# Patient Record
Sex: FEMALE | Race: WHITE | Employment: PART TIME | URBAN - METROPOLITAN AREA
[De-identification: names, ages, dates, MRNs, and addresses within clinical notes are randomized per-mention and may not be internally consistent; named-entity substitution may affect disease eponyms.]

---

## 2020-11-03 ENCOUNTER — HOSPITAL ENCOUNTER (OUTPATIENT)
Dept: WOMENS IMAGING | Age: 60
Discharge: HOME | End: 2020-11-03
Payer: COMMERCIAL

## 2020-11-03 PROCEDURE — 77067 SCR MAMMO BI INCL CAD: CPT

## 2020-11-11 ENCOUNTER — HOSPITAL ENCOUNTER (OUTPATIENT)
Dept: WOMENS IMAGING | Age: 60
Discharge: HOME | End: 2020-11-11
Payer: COMMERCIAL

## 2020-11-11 ENCOUNTER — HOSPITAL ENCOUNTER (OUTPATIENT)
Dept: ULTRASOUND IMAGING | Age: 60
Discharge: HOME | End: 2020-11-11
Payer: COMMERCIAL

## 2020-11-11 PROCEDURE — 76642 ULTRASOUND BREAST LIMITED: CPT

## 2020-11-11 PROCEDURE — G0279 TOMOSYNTHESIS, MAMMO: HCPCS

## 2021-12-16 ENCOUNTER — HOSPITAL ENCOUNTER (OUTPATIENT)
Dept: ULTRASOUND IMAGING | Age: 61
End: 2021-12-16
Payer: COMMERCIAL

## 2021-12-16 ENCOUNTER — HOSPITAL ENCOUNTER (OUTPATIENT)
Dept: WOMENS IMAGING | Age: 61
Discharge: HOME | End: 2021-12-16
Payer: COMMERCIAL

## 2021-12-16 DIAGNOSIS — R92.8 ABNORMAL MAMMOGRAM: ICD-10-CM

## 2021-12-16 PROCEDURE — G0279 TOMOSYNTHESIS, MAMMO: HCPCS

## 2023-01-20 ENCOUNTER — HOSPITAL ENCOUNTER (OUTPATIENT)
Age: 63
Discharge: HOME | End: 2023-01-20
Payer: COMMERCIAL

## 2023-01-20 DIAGNOSIS — J43.2 CENTRILOBULAR EMPHYSEMA (HCC): ICD-10-CM

## 2023-01-20 LAB
BASOPHILS ABSOLUTE: 0.1 K/CU MM
BASOPHILS RELATIVE PERCENT: 1 % (ref 0–1)
DIFFERENTIAL TYPE: ABNORMAL
EOSINOPHILS ABSOLUTE: 0.5 K/CU MM
EOSINOPHILS RELATIVE PERCENT: 5.8 % (ref 0–3)
HCT VFR BLD CALC: 42.7 % (ref 37–47)
HEMOGLOBIN: 13.4 GM/DL (ref 12.5–16)
IMMATURE NEUTROPHIL %: 0.4 % (ref 0–0.43)
LYMPHOCYTES ABSOLUTE: 2.5 K/CU MM
LYMPHOCYTES RELATIVE PERCENT: 27.3 % (ref 24–44)
MCH RBC QN AUTO: 28.2 PG (ref 27–31)
MCHC RBC AUTO-ENTMCNC: 31.4 % (ref 32–36)
MCV RBC AUTO: 89.9 FL (ref 78–100)
MONOCYTES ABSOLUTE: 0.6 K/CU MM
MONOCYTES RELATIVE PERCENT: 6.7 % (ref 0–4)
NUCLEATED RBC %: 0 %
PDW BLD-RTO: 13 % (ref 11.7–14.9)
PLATELET # BLD: 229 K/CU MM (ref 140–440)
PMV BLD AUTO: 10.4 FL (ref 7.5–11.1)
RBC # BLD: 4.75 M/CU MM (ref 4.2–5.4)
SEGMENTED NEUTROPHILS ABSOLUTE COUNT: 5.3 K/CU MM
SEGMENTED NEUTROPHILS RELATIVE PERCENT: 58.8 % (ref 36–66)
TOTAL IMMATURE NEUTOROPHIL: 0.04 K/CU MM
TOTAL NUCLEATED RBC: 0 K/CU MM
WBC # BLD: 9 K/CU MM (ref 4–10.5)

## 2023-01-20 PROCEDURE — 85025 COMPLETE CBC W/AUTO DIFF WBC: CPT

## 2023-01-20 PROCEDURE — 82785 ASSAY OF IGE: CPT

## 2023-01-20 PROCEDURE — 36415 COLL VENOUS BLD VENIPUNCTURE: CPT

## 2023-01-21 LAB — IMMUNOGLOBULIN E: 104 KU/L

## 2023-02-13 ENCOUNTER — HOSPITAL ENCOUNTER (OUTPATIENT)
Age: 63
Discharge: HOME | End: 2023-02-13
Payer: COMMERCIAL

## 2023-02-13 ENCOUNTER — HOSPITAL ENCOUNTER (OUTPATIENT)
Dept: GENERAL RADIOLOGY | Age: 63
Discharge: HOME | End: 2023-02-13
Payer: COMMERCIAL

## 2023-02-13 DIAGNOSIS — R05.1 ACUTE COUGH: ICD-10-CM

## 2023-02-13 PROCEDURE — 71046 X-RAY EXAM CHEST 2 VIEWS: CPT

## 2023-04-06 ENCOUNTER — APPOINTMENT (OUTPATIENT)
Dept: CT IMAGING | Age: 63
End: 2023-04-06
Payer: COMMERCIAL

## 2023-04-06 ENCOUNTER — HOSPITAL ENCOUNTER (INPATIENT)
Age: 63
LOS: 1 days | Discharge: HOME | End: 2023-04-07
Attending: EMERGENCY MEDICINE | Admitting: HOSPITALIST
Payer: COMMERCIAL

## 2023-04-06 ENCOUNTER — APPOINTMENT (OUTPATIENT)
Dept: GENERAL RADIOLOGY | Age: 63
End: 2023-04-06
Payer: COMMERCIAL

## 2023-04-06 DIAGNOSIS — I16.0 HYPERTENSIVE URGENCY: Primary | ICD-10-CM

## 2023-04-06 LAB
ALBUMIN SERPL-MCNC: 4.5 GM/DL (ref 3.4–5)
ALP BLD-CCNC: 88 IU/L (ref 40–128)
ALT SERPL-CCNC: 19 U/L (ref 10–40)
ANION GAP SERPL CALCULATED.3IONS-SCNC: 8 MMOL/L (ref 4–16)
AST SERPL-CCNC: 14 IU/L (ref 15–37)
B PARAP IS1001 DNA NPH QL NAA+NON-PROBE: NOT DETECTED
B PERT.PT PRMT NPH QL NAA+NON-PROBE: NOT DETECTED
BACTERIA: NEGATIVE /HPF
BASOPHILS ABSOLUTE: 0.1 K/CU MM
BASOPHILS RELATIVE PERCENT: 1.3 % (ref 0–1)
BILIRUB SERPL-MCNC: 0.6 MG/DL (ref 0–1)
BILIRUBIN URINE: NEGATIVE MG/DL
BLOOD, URINE: NEGATIVE
BUN SERPL-MCNC: 16 MG/DL (ref 6–23)
C PNEUM DNA NPH QL NAA+NON-PROBE: NOT DETECTED
CALCIUM SERPL-MCNC: 9.4 MG/DL (ref 8.3–10.6)
CHLORIDE BLD-SCNC: 108 MMOL/L (ref 99–110)
CLARITY: CLEAR
CO2: 25 MMOL/L (ref 21–32)
COLOR: YELLOW
CREAT SERPL-MCNC: 0.7 MG/DL (ref 0.6–1.1)
DIFFERENTIAL TYPE: ABNORMAL
EKG ATRIAL RATE: 90 BPM
EKG DIAGNOSIS: NORMAL
EKG P AXIS: 34 DEGREES
EKG P-R INTERVAL: 132 MS
EKG Q-T INTERVAL: 350 MS
EKG QRS DURATION: 86 MS
EKG QTC CALCULATION (BAZETT): 428 MS
EKG R AXIS: -42 DEGREES
EKG T AXIS: 72 DEGREES
EKG VENTRICULAR RATE: 90 BPM
EOSINOPHILS ABSOLUTE: 0.4 K/CU MM
EOSINOPHILS RELATIVE PERCENT: 5.3 % (ref 0–3)
FLUAV H1 2009 PAN RNA NPH NAA+NON-PROBE: NOT DETECTED
FLUAV H1 RNA NPH QL NAA+NON-PROBE: NOT DETECTED
FLUAV H3 RNA NPH QL NAA+NON-PROBE: NOT DETECTED
FLUAV RNA NPH QL NAA+NON-PROBE: NOT DETECTED
FLUBV RNA NPH QL NAA+NON-PROBE: NOT DETECTED
GFR SERPL CREATININE-BSD FRML MDRD: >60 ML/MIN/1.73M2
GLUCOSE SERPL-MCNC: 103 MG/DL (ref 70–99)
GLUCOSE, URINE: NEGATIVE MG/DL
HADV DNA NPH QL NAA+NON-PROBE: NOT DETECTED
HCOV 229E RNA NPH QL NAA+NON-PROBE: NOT DETECTED
HCOV HKU1 RNA NPH QL NAA+NON-PROBE: NOT DETECTED
HCOV NL63 RNA NPH QL NAA+NON-PROBE: NOT DETECTED
HCOV OC43 RNA NPH QL NAA+NON-PROBE: NOT DETECTED
HCT VFR BLD CALC: 44.2 % (ref 37–47)
HEMOGLOBIN: 14.2 GM/DL (ref 12.5–16)
HMPV RNA NPH QL NAA+NON-PROBE: NOT DETECTED
HPIV1 RNA NPH QL NAA+NON-PROBE: NOT DETECTED
HPIV2 RNA NPH QL NAA+NON-PROBE: NOT DETECTED
HPIV3 RNA NPH QL NAA+NON-PROBE: NOT DETECTED
HPIV4 RNA NPH QL NAA+NON-PROBE: NOT DETECTED
IMMATURE NEUTROPHIL %: 0.1 % (ref 0–0.43)
KETONES, URINE: NEGATIVE MG/DL
LEUKOCYTE ESTERASE, URINE: ABNORMAL
LYMPHOCYTES ABSOLUTE: 1.7 K/CU MM
LYMPHOCYTES RELATIVE PERCENT: 24.9 % (ref 24–44)
M PNEUMO DNA NPH QL NAA+NON-PROBE: NOT DETECTED
MCH RBC QN AUTO: 28.5 PG (ref 27–31)
MCHC RBC AUTO-ENTMCNC: 32.1 % (ref 32–36)
MCV RBC AUTO: 88.6 FL (ref 78–100)
MONOCYTES ABSOLUTE: 0.5 K/CU MM
MONOCYTES RELATIVE PERCENT: 6.8 % (ref 0–4)
NITRITE URINE, QUANTITATIVE: NEGATIVE
NUCLEATED RBC %: 0 %
PDW BLD-RTO: 12.9 % (ref 11.7–14.9)
PH, URINE: 5 (ref 5–8)
PLATELET # BLD: 237 K/CU MM (ref 140–440)
PMV BLD AUTO: 9.9 FL (ref 7.5–11.1)
POTASSIUM SERPL-SCNC: 4.7 MMOL/L (ref 3.5–5.1)
PROTEIN UA: NEGATIVE MG/DL
RBC # BLD: 4.99 M/CU MM (ref 4.2–5.4)
RBC URINE: 1 /HPF (ref 0–6)
RSV RNA NPH QL NAA+NON-PROBE: NOT DETECTED
RV+EV RNA NPH QL NAA+NON-PROBE: NOT DETECTED
SARS-COV-2 RNA NPH QL NAA+NON-PROBE: NOT DETECTED
SEGMENTED NEUTROPHILS ABSOLUTE COUNT: 4.3 K/CU MM
SEGMENTED NEUTROPHILS RELATIVE PERCENT: 61.6 % (ref 36–66)
SODIUM BLD-SCNC: 141 MMOL/L (ref 135–145)
SPECIFIC GRAVITY UA: 1.01 (ref 1–1.03)
SQUAMOUS EPITHELIAL: 3 /HPF
TOTAL IMMATURE NEUTOROPHIL: 0.01 K/CU MM
TOTAL NUCLEATED RBC: 0 K/CU MM
TOTAL PROTEIN: 6.8 GM/DL (ref 6.4–8.2)
TRICHOMONAS: NORMAL /HPF
TROPONIN T: <0.01 NG/ML
UROBILINOGEN, URINE: 0.2 MG/DL (ref 0.2–1)
WBC # BLD: 7 K/CU MM (ref 4–10.5)
WBC UA: 2 /HPF (ref 0–5)

## 2023-04-06 PROCEDURE — 80053 COMPREHEN METABOLIC PANEL: CPT

## 2023-04-06 PROCEDURE — 70450 CT HEAD/BRAIN W/O DYE: CPT

## 2023-04-06 PROCEDURE — 84484 ASSAY OF TROPONIN QUANT: CPT

## 2023-04-06 PROCEDURE — 6370000000 HC RX 637 (ALT 250 FOR IP): Performed by: EMERGENCY MEDICINE

## 2023-04-06 PROCEDURE — 85025 COMPLETE CBC W/AUTO DIFF WBC: CPT

## 2023-04-06 PROCEDURE — 94640 AIRWAY INHALATION TREATMENT: CPT

## 2023-04-06 PROCEDURE — 2060000000 HC ICU INTERMEDIATE R&B

## 2023-04-06 PROCEDURE — 36415 COLL VENOUS BLD VENIPUNCTURE: CPT

## 2023-04-06 PROCEDURE — 0202U NFCT DS 22 TRGT SARS-COV-2: CPT

## 2023-04-06 PROCEDURE — 6370000000 HC RX 637 (ALT 250 FOR IP): Performed by: NURSE PRACTITIONER

## 2023-04-06 PROCEDURE — 6360000002 HC RX W HCPCS: Performed by: EMERGENCY MEDICINE

## 2023-04-06 PROCEDURE — 6360000002 HC RX W HCPCS: Performed by: NURSE PRACTITIONER

## 2023-04-06 PROCEDURE — 2580000003 HC RX 258: Performed by: NURSE PRACTITIONER

## 2023-04-06 PROCEDURE — 71045 X-RAY EXAM CHEST 1 VIEW: CPT

## 2023-04-06 PROCEDURE — 6360000004 HC RX CONTRAST MEDICATION: Performed by: EMERGENCY MEDICINE

## 2023-04-06 PROCEDURE — 70498 CT ANGIOGRAPHY NECK: CPT

## 2023-04-06 PROCEDURE — 81001 URINALYSIS AUTO W/SCOPE: CPT

## 2023-04-06 PROCEDURE — 93005 ELECTROCARDIOGRAM TRACING: CPT | Performed by: EMERGENCY MEDICINE

## 2023-04-06 RX ORDER — ACETAMINOPHEN 325 MG/1
650 TABLET ORAL EVERY 6 HOURS PRN
Status: DISCONTINUED | OUTPATIENT
Start: 2023-04-06 | End: 2023-04-07 | Stop reason: HOSPADM

## 2023-04-06 RX ORDER — FLUTICASONE PROPIONATE 50 MCG
2 SPRAY, SUSPENSION (ML) NASAL DAILY
COMMUNITY

## 2023-04-06 RX ORDER — HYDRALAZINE HYDROCHLORIDE 20 MG/ML
10 INJECTION INTRAMUSCULAR; INTRAVENOUS EVERY 6 HOURS PRN
Status: DISCONTINUED | OUTPATIENT
Start: 2023-04-06 | End: 2023-04-07 | Stop reason: HOSPADM

## 2023-04-06 RX ORDER — GUAIFENESIN 600 MG/1
600 TABLET, EXTENDED RELEASE ORAL 2 TIMES DAILY
Status: DISCONTINUED | OUTPATIENT
Start: 2023-04-06 | End: 2023-04-07 | Stop reason: HOSPADM

## 2023-04-06 RX ORDER — POLYETHYLENE GLYCOL 3350 17 G/17G
17 POWDER, FOR SOLUTION ORAL DAILY PRN
Status: DISCONTINUED | OUTPATIENT
Start: 2023-04-06 | End: 2023-04-07 | Stop reason: HOSPADM

## 2023-04-06 RX ORDER — PROMETHAZINE HYDROCHLORIDE 6.25 MG/5ML
6.25 SYRUP ORAL ONCE
Status: DISCONTINUED | OUTPATIENT
Start: 2023-04-06 | End: 2023-04-06

## 2023-04-06 RX ORDER — MONTELUKAST SODIUM 10 MG/1
10 TABLET ORAL NIGHTLY
Status: DISCONTINUED | OUTPATIENT
Start: 2023-04-06 | End: 2023-04-07 | Stop reason: HOSPADM

## 2023-04-06 RX ORDER — HYDRALAZINE HYDROCHLORIDE 20 MG/ML
10 INJECTION INTRAMUSCULAR; INTRAVENOUS ONCE
Status: COMPLETED | OUTPATIENT
Start: 2023-04-06 | End: 2023-04-06

## 2023-04-06 RX ORDER — SODIUM CHLORIDE 0.9 % (FLUSH) 0.9 %
5-40 SYRINGE (ML) INJECTION PRN
Status: DISCONTINUED | OUTPATIENT
Start: 2023-04-06 | End: 2023-04-07 | Stop reason: HOSPADM

## 2023-04-06 RX ORDER — AMLODIPINE BESYLATE 5 MG/1
5 TABLET ORAL DAILY
Status: DISCONTINUED | OUTPATIENT
Start: 2023-04-06 | End: 2023-04-07 | Stop reason: HOSPADM

## 2023-04-06 RX ORDER — PROMETHAZINE HYDROCHLORIDE 6.25 MG/5ML
6.25 SYRUP ORAL ONCE
Status: COMPLETED | OUTPATIENT
Start: 2023-04-06 | End: 2023-04-06

## 2023-04-06 RX ORDER — KETOROLAC TROMETHAMINE 30 MG/ML
30 INJECTION, SOLUTION INTRAMUSCULAR; INTRAVENOUS ONCE
Status: COMPLETED | OUTPATIENT
Start: 2023-04-06 | End: 2023-04-06

## 2023-04-06 RX ORDER — SODIUM CHLORIDE 0.9 % (FLUSH) 0.9 %
5-40 SYRINGE (ML) INJECTION EVERY 12 HOURS SCHEDULED
Status: DISCONTINUED | OUTPATIENT
Start: 2023-04-06 | End: 2023-04-07 | Stop reason: HOSPADM

## 2023-04-06 RX ORDER — SODIUM CHLORIDE 9 MG/ML
500 INJECTION, SOLUTION INTRAVENOUS PRN
Status: DISCONTINUED | OUTPATIENT
Start: 2023-04-06 | End: 2023-04-07 | Stop reason: HOSPADM

## 2023-04-06 RX ORDER — BUDESONIDE AND FORMOTEROL FUMARATE DIHYDRATE 160; 4.5 UG/1; UG/1
2 AEROSOL RESPIRATORY (INHALATION) 2 TIMES DAILY
Status: DISCONTINUED | OUTPATIENT
Start: 2023-04-06 | End: 2023-04-07 | Stop reason: HOSPADM

## 2023-04-06 RX ORDER — ACETAMINOPHEN 325 MG/1
650 TABLET ORAL EVERY 4 HOURS PRN
Status: DISCONTINUED | OUTPATIENT
Start: 2023-04-06 | End: 2023-04-06 | Stop reason: SDUPTHER

## 2023-04-06 RX ORDER — ACETAMINOPHEN 650 MG/1
650 SUPPOSITORY RECTAL EVERY 6 HOURS PRN
Status: DISCONTINUED | OUTPATIENT
Start: 2023-04-06 | End: 2023-04-07 | Stop reason: HOSPADM

## 2023-04-06 RX ORDER — HYDROXYZINE HYDROCHLORIDE 10 MG/1
10 TABLET, FILM COATED ORAL 3 TIMES DAILY PRN
Status: DISCONTINUED | OUTPATIENT
Start: 2023-04-06 | End: 2023-04-07 | Stop reason: HOSPADM

## 2023-04-06 RX ORDER — LOSARTAN POTASSIUM 25 MG/1
50 TABLET ORAL DAILY
Status: DISCONTINUED | OUTPATIENT
Start: 2023-04-06 | End: 2023-04-07 | Stop reason: HOSPADM

## 2023-04-06 RX ORDER — ONDANSETRON 4 MG/1
4 TABLET, ORALLY DISINTEGRATING ORAL EVERY 8 HOURS PRN
Status: DISCONTINUED | OUTPATIENT
Start: 2023-04-06 | End: 2023-04-07 | Stop reason: HOSPADM

## 2023-04-06 RX ORDER — ENOXAPARIN SODIUM 100 MG/ML
40 INJECTION SUBCUTANEOUS DAILY
Status: DISCONTINUED | OUTPATIENT
Start: 2023-04-07 | End: 2023-04-07 | Stop reason: HOSPADM

## 2023-04-06 RX ORDER — BUDESONIDE, GLYCOPYRROLATE, AND FORMOTEROL FUMARATE 160; 9; 4.8 UG/1; UG/1; UG/1
2 AEROSOL, METERED RESPIRATORY (INHALATION) 2 TIMES DAILY
COMMUNITY

## 2023-04-06 RX ORDER — IPRATROPIUM BROMIDE AND ALBUTEROL SULFATE 2.5; .5 MG/3ML; MG/3ML
1 SOLUTION RESPIRATORY (INHALATION) EVERY 4 HOURS PRN
Status: DISCONTINUED | OUTPATIENT
Start: 2023-04-06 | End: 2023-04-07 | Stop reason: HOSPADM

## 2023-04-06 RX ORDER — PROMETHAZINE HYDROCHLORIDE 12.5 MG/1
6.25 TABLET ORAL ONCE
Status: DISCONTINUED | OUTPATIENT
Start: 2023-04-06 | End: 2023-04-06

## 2023-04-06 RX ORDER — ALBUTEROL SULFATE 90 UG/1
2 AEROSOL, METERED RESPIRATORY (INHALATION) EVERY 6 HOURS PRN
Status: DISCONTINUED | OUTPATIENT
Start: 2023-04-06 | End: 2023-04-07 | Stop reason: HOSPADM

## 2023-04-06 RX ORDER — ONDANSETRON 2 MG/ML
4 INJECTION INTRAMUSCULAR; INTRAVENOUS EVERY 6 HOURS PRN
Status: DISCONTINUED | OUTPATIENT
Start: 2023-04-06 | End: 2023-04-07 | Stop reason: HOSPADM

## 2023-04-06 RX ADMIN — Medication 6.25 MG: at 14:10

## 2023-04-06 RX ADMIN — HYDRALAZINE HYDROCHLORIDE 10 MG: 20 INJECTION INTRAMUSCULAR; INTRAVENOUS at 14:09

## 2023-04-06 RX ADMIN — GUAIFENESIN 600 MG: 600 TABLET, EXTENDED RELEASE ORAL at 21:16

## 2023-04-06 RX ADMIN — HYDRALAZINE HYDROCHLORIDE 10 MG: 20 INJECTION INTRAMUSCULAR; INTRAVENOUS at 12:18

## 2023-04-06 RX ADMIN — GUAIFENESIN 600 MG: 600 TABLET, EXTENDED RELEASE ORAL at 14:10

## 2023-04-06 RX ADMIN — LOSARTAN POTASSIUM 50 MG: 25 TABLET, FILM COATED ORAL at 17:49

## 2023-04-06 RX ADMIN — ACETAMINOPHEN 650 MG: 325 TABLET ORAL at 17:49

## 2023-04-06 RX ADMIN — ONDANSETRON 4 MG: 2 INJECTION INTRAMUSCULAR; INTRAVENOUS at 18:18

## 2023-04-06 RX ADMIN — IOPAMIDOL 80 ML: 755 INJECTION, SOLUTION INTRAVENOUS at 12:24

## 2023-04-06 RX ADMIN — BUDESONIDE AND FORMOTEROL FUMARATE DIHYDRATE 2 PUFF: 160; 4.5 AEROSOL RESPIRATORY (INHALATION) at 19:14

## 2023-04-06 RX ADMIN — AMLODIPINE BESYLATE 5 MG: 5 TABLET ORAL at 17:49

## 2023-04-06 RX ADMIN — KETOROLAC TROMETHAMINE 30 MG: 30 INJECTION, SOLUTION INTRAMUSCULAR; INTRAVENOUS at 14:09

## 2023-04-06 RX ADMIN — SODIUM CHLORIDE, PRESERVATIVE FREE 10 ML: 5 INJECTION INTRAVENOUS at 21:18

## 2023-04-06 RX ADMIN — MONTELUKAST 10 MG: 10 TABLET, FILM COATED ORAL at 21:17

## 2023-04-06 ASSESSMENT — PAIN DESCRIPTION - LOCATION
LOCATION: HEAD

## 2023-04-06 ASSESSMENT — PAIN SCALES - GENERAL
PAINLEVEL_OUTOF10: 7
PAINLEVEL_OUTOF10: 3
PAINLEVEL_OUTOF10: 10
PAINLEVEL_OUTOF10: 5

## 2023-04-06 ASSESSMENT — LIFESTYLE VARIABLES: HOW OFTEN DO YOU HAVE A DRINK CONTAINING ALCOHOL: NEVER

## 2023-04-06 ASSESSMENT — PAIN - FUNCTIONAL ASSESSMENT: PAIN_FUNCTIONAL_ASSESSMENT: 0-10

## 2023-04-06 NOTE — ED NOTES
1538 perfect serve message sent to Dr Rubi Sneed on in pt consult from hospitalist.      Chaim Morocho  04/06/23 1532    1535 Dr Rubi Sneed acknowledged perfect serve message.  Added to treatment team.      Chaim Morocho  04/06/23 2225
EKG Complete     Tyler Colmenares  04/06/23 1020
Medication History  [facility]    Patient Name: Dulce Wood 1960     Medication history has been completed by: Flor Stokes CPhT    Source(s) of information: patient supplied medications from home and insurance claims     Primary Care Physician: Felix Ricketts MD     Pharmacy: [facility]    Allergies as of 04/06/2023 - Fully Reviewed 04/06/2023   Allergen Reaction Noted    Amoxicillin Rash 02/03/2012        Prior to Admission medications    Medication Sig Start Date End Date Taking?  Authorizing Provider   Budeson-Glycopyrrol-Formoterol (BREZTRI AEROSPHERE) 160-9-4.8 MCG/ACT AERO Inhale 2 puffs into the lungs 2 times daily   Yes Historical Provider, MD   fluticasone (FLONASE) 50 MCG/ACT nasal spray 2 sprays by Each Nostril route daily   Yes Historical Provider, MD   sertraline (ZOLOFT) 100 MG tablet Take 1 tablet by mouth daily 1/27/23   Historical Provider, MD   ascorbic acid (VITAMIN C) 500 MG tablet Take 1 tablet by mouth    Historical Provider, MD   hydrOXYzine HCl (ATARAX) 25 MG tablet Take 0.5 tablets by mouth every 8 hours as needed Patient states she does not take take this routinely, states only takes 1/2 of a 25 mg tablet 3/6/23   Historical Provider, MD   Magnesium Gluconate 550 MG TABS Take 30 mg by mouth 2 times daily    Historical Provider, MD   montelukast (SINGULAIR) 10 MG tablet Take 1 tablet by mouth  nightly 4/13/22   Historical Provider, MD   Spacer/Aero-Holding Chambers (AEROCHAMBER MV) MISC 1 actuation by Does not apply route in the morning and at bedtime 2/21/23   Ninoska Cisneros MD   albuterol sulfate HFA (PROVENTIL;VENTOLIN;PROAIR) 108 (90 Base) MCG/ACT inhaler 2 puffs every 6 hours as needed 11/21/22   Historical Provider, MD   benzonatate (TESSALON) 100 MG capsule Take 1 capsule by mouth Three times daily as needed 1/28/22   Historical Provider, MD   guaiFENesin (MUCINEX) 600 MG extended release tablet Take one tablet by mouth two times per day as needed
flow limiting stenosis or dissection seen of the cervical   carotid/vertebral arteries. 3. No significant stenosis or large vessel occlusion of the [id number]. 4. There is a 6 mm nodule within the right upper lobe abutting the   mediastinum. Recommendations as below. RECOMMENDATIONS:   6 mm right solid pulmonary nodule within the upper lobe. Recommend a   non-contrast Chest CT at 6-12 months. If patient is high risk for malignancy,   recommend an additional non-contrast Chest CT at 18-24 months; if patient is   low risk for malignancy a non-contrast Chest CT at 18-24 months is optional.   These guidelines do not apply to immunocompromised patients and patients with   cancer. Follow up in patients with significant comorbidities as clinically   warranted. For lung cancer screening, adhere to Lung-RADS guidelines. Reference: Radiology. 2017; 284(1):228-43. XR CHEST PORTABLE   Final Result   1. No active pulmonary disease.            Abnormal labs:   Abnormal Labs Reviewed   CBC WITH AUTO DIFFERENTIAL - Abnormal; Notable for the following components:       Result Value    Monocytes % 6.8 (*)     Eosinophils % 5.3 (*)     Basophils % 1.3 (*)     All other components within normal limits   COMPREHENSIVE METABOLIC PANEL - Abnormal; Notable for the following components:    Glucose 103 (*)     AST 14 (*)     All other components within normal limits   URINALYSIS - Abnormal; Notable for the following components:    Leukocyte Esterase, Urine TRACE (*)     All other components within normal limits       Background  History:   Past Medical History:   Diagnosis Date    Anxiety     Asthma     Depression     Recurrent sinus infections        Assessment    Vitals/MEWS: MEWS Score: 1  Level of Consciousness: Alert (0)   Vitals:    04/06/23 1600 04/06/23 1615 04/06/23 1630 04/06/23 1645   BP: (!) 168/108 (!) 154/109 (!) 132/90 (!) 150/88   Pulse: 88 84 82 81   Resp: 16 18 13 20   Temp:    98.9 °F (37.2 °C)

## 2023-04-06 NOTE — CONSULTS
There are areas of hypoattenuation in the periventricular and subcortical white matter, which is nonspecific, but may represent chronic microvasvular ischemic change. There is prominence of the ventricles and sulci due to global parenchymal volume loss. Scattered atherosclerosis of the intracranial vasculature. ORBITS: A chronic defect is seen involving the medial wall the right orbit. No acute abnormality seen the orbits. SINUSES:  Scattered mucosal thickening of the ethmoid sinuses. The mastoid air cells demonstrate no acute abnormality. SOFT TISSUES/SKULL: No acute abnormality of the visualized skull or soft tissues. CTA NECK: AORTIC ARCH/ARCH VESSELS: No dissection or arterial injury. No significant stenosis of the brachiocephalic or subclavian arteries. CAROTID ARTERIES: No dissection, arterial injury, or hemodynamically significant stenosis by NASCET criteria. VERTEBRAL ARTERIES: No dissection, arterial injury, or significant stenosis. SOFT TISSUES: There is a 6 mm nodule within the right upper lobe abutting the mediastinum (axial series 308 image 38). No acute abnormality is seen within the visualized mediastinum. There is asymmetric prominence involving the right lingual tonsil without convincing evidence of a discrete mass. BONES: No acute osseous abnormality. CTA HEAD: ANTERIOR CIRCULATION: Atherosclerosis of the internal carotid arteries bilaterally. No significant stenosis seen of the internal carotid arteries. No significant stenosis seen of the anterior cerebral or middle cerebral arteries. No aneurysm identified. POSTERIOR CIRCULATION: No significant stenosis of the vertebral, basilar, or posterior cerebral arteries. No aneurysm. OTHER: No dural venous sinus thrombosis on this non-dedicated study. 1. No acute intracranial abnormality. 2. No flow limiting stenosis or dissection seen of the cervical carotid/vertebral arteries.  3. No significant stenosis or large vessel occlusion of the

## 2023-04-06 NOTE — H&P
immunocompromised patients and patients with cancer. Follow up in patients with significant comorbidities as clinically warranted. For lung cancer screening, adhere to Lung-RADS guidelines. Reference: Radiology. 2017; 284(1):228-43. XR CHEST PORTABLE    Result Date: 4/6/2023  EXAMINATION: ONE XRAY VIEW OF THE CHEST 4/6/2023 11:26 am COMPARISON: 02/13/2023. HISTORY: ORDERING SYSTEM PROVIDED HISTORY: Hypertension TECHNOLOGIST PROVIDED HISTORY: Reason for exam:->Hypertension Reason for Exam: Hypertension FINDINGS: The heart size is within normal limits. The pulmonary vasculature is also within normal limits. No acute infiltrates are seen. No pneumothoraces are noted. 1. No active pulmonary disease. CTA HEAD NECK W CONTRAST    Result Date: 4/6/2023  EXAMINATION: CTA OF THE HEAD AND NECK WITH CONTRAST; CT OF THE HEAD WITHOUT CONTRAST 4/6/2023 12:25 pm; 4/6/2023 12:23 pm: TECHNIQUE: CTA of the head and neck was performed with the administration of intravenous contrast. Multiplanar reformatted images are provided for review. MIP images are provided for review. Stenosis of the internal carotid arteries measured using NASCET criteria. Automated exposure control, iterative reconstruction, and/or weight based adjustment of the mA/kV was utilized to reduce the radiation dose to as low as reasonably achievable.; CT of the head was performed without the administration of intravenous contrast. Automated exposure control, iterative reconstruction, and/or weight based adjustment of the mA/kV was utilized to reduce the radiation dose to as low as reasonably achievable. Noncontrast CT of the head with reconstructed 2-D images are also provided for review. COMPARISON: None. HISTORY: ORDERING SYSTEM PROVIDED HISTORY: HTN TECHNOLOGIST PROVIDED HISTORY: Reason for exam:->HTN Has a \"code stroke\" or \"stroke alert\" been called? ->No Decision Support Exception - unselect if not a suspected or confirmed emergency medical

## 2023-04-06 NOTE — ED PROVIDER NOTES
Medications    ALBUTEROL SULFATE HFA (PROVENTIL;VENTOLIN;PROAIR) 108 (90 BASE) MCG/ACT INHALER        ASCORBIC ACID (VITAMIN C) 500 MG TABLET    Take 500 mg by mouth    BENZONATATE (TESSALON) 100 MG CAPSULE    Take 100 mg by mouth Three times daily as needed    CHLORPHENIRAMINE-PSE-IBUPROFEN 2- MG TABS    05/16/2013 Chlorpheniramine-Pseudoephed-Ibuprofen Oral  PO 1.0 TABLET(S) PRN  05/16/2013  active    CHOLECALCIFEROL 10 MCG (400 UNIT) CHEW    Take one tablet daily by mouth    CYANOCOBALAMIN 100 MCG TABLET    Take 100 mcg by mouth    CYANOCOBALAMIN 1000 MCG SUBL    Take one tablet daily by mouth    GUAIFENESIN (MUCINEX) 600 MG EXTENDED RELEASE TABLET    Take one tablet by mouth two times per day as needed for congestion    HYDROXYZINE HCL (ATARAX) 25 MG TABLET        MAGNESIUM GLUCONATE 550 MG TABS    Take 30 mg by mouth 2 times daily    MONTELUKAST (SINGULAIR) 10 MG TABLET    Take 10 mg by mouth    SERTRALINE (ZOLOFT) 100 MG TABLET    Take 100 mg by mouth daily    SPACER/AERO-HOLDING CHAMBERS (AEROCHAMBER MV) MISC    1 actuation by Does not apply route in the morning and at bedtime       ALLERGIES     Amoxicillin    FAMILY HISTORY       Family History   Problem Relation Age of Onset    High Blood Pressure Mother     Breast Cancer Mother 80    Ovarian Cancer Neg Hx           SOCIAL HISTORY       Social History     Socioeconomic History    Marital status:      Spouse name: None    Number of children: None    Years of education: None    Highest education level: None   Tobacco Use    Smoking status: Former     Packs/day: 1.00     Years: 44.00     Pack years: 44.00     Types: Cigarettes   Substance and Sexual Activity    Alcohol use: No    Drug use: No       PHYSICAL EXAM       ED Triage Vitals [04/06/23 1024]   BP Temp Temp Source Heart Rate Resp SpO2 Height Weight   (!) 203/109 98.7 °F (37.1 °C) Oral 98 16 97 % 5' 8\" (1.727 m) 222 lb (100.7 kg)       Physical Exam  Vitals reviewed.    Constitutional:

## 2023-04-07 VITALS
BODY MASS INDEX: 33.65 KG/M2 | HEIGHT: 68 IN | TEMPERATURE: 98.4 F | SYSTOLIC BLOOD PRESSURE: 133 MMHG | OXYGEN SATURATION: 92 % | WEIGHT: 222 LBS | RESPIRATION RATE: 21 BRPM | HEART RATE: 81 BPM | DIASTOLIC BLOOD PRESSURE: 74 MMHG

## 2023-04-07 PROBLEM — I16.0 HYPERTENSIVE URGENCY: Status: RESOLVED | Noted: 2023-04-06 | Resolved: 2023-04-07

## 2023-04-07 LAB
ANION GAP SERPL CALCULATED.3IONS-SCNC: 11 MMOL/L (ref 4–16)
BASOPHILS ABSOLUTE: 0.1 K/CU MM
BASOPHILS RELATIVE PERCENT: 0.8 % (ref 0–1)
BUN SERPL-MCNC: 16 MG/DL (ref 6–23)
CALCIUM SERPL-MCNC: 8.9 MG/DL (ref 8.3–10.6)
CHLORIDE BLD-SCNC: 105 MMOL/L (ref 99–110)
CHOLEST SERPL-MCNC: 227 MG/DL
CO2: 24 MMOL/L (ref 21–32)
CREAT SERPL-MCNC: 0.6 MG/DL (ref 0.6–1.1)
DIFFERENTIAL TYPE: ABNORMAL
EOSINOPHILS ABSOLUTE: 0.2 K/CU MM
EOSINOPHILS RELATIVE PERCENT: 2.4 % (ref 0–3)
GFR SERPL CREATININE-BSD FRML MDRD: >60 ML/MIN/1.73M2
GLUCOSE SERPL-MCNC: 111 MG/DL (ref 70–99)
HCT VFR BLD CALC: 42.3 % (ref 37–47)
HDLC SERPL-MCNC: 78 MG/DL
HEMOGLOBIN: 13.7 GM/DL (ref 12.5–16)
IMMATURE NEUTROPHIL %: 0.2 % (ref 0–0.43)
LDLC SERPL CALC-MCNC: 134 MG/DL
LV EF: 58 %
LVEF MODALITY: NORMAL
LYMPHOCYTES ABSOLUTE: 1.6 K/CU MM
LYMPHOCYTES RELATIVE PERCENT: 17.5 % (ref 24–44)
MCH RBC QN AUTO: 28 PG (ref 27–31)
MCHC RBC AUTO-ENTMCNC: 32.4 % (ref 32–36)
MCV RBC AUTO: 86.5 FL (ref 78–100)
MONOCYTES ABSOLUTE: 0.5 K/CU MM
MONOCYTES RELATIVE PERCENT: 5.8 % (ref 0–4)
NUCLEATED RBC %: 0 %
PDW BLD-RTO: 13.1 % (ref 11.7–14.9)
PLATELET # BLD: 239 K/CU MM (ref 140–440)
PMV BLD AUTO: 10.1 FL (ref 7.5–11.1)
POTASSIUM SERPL-SCNC: 4.4 MMOL/L (ref 3.5–5.1)
RBC # BLD: 4.89 M/CU MM (ref 4.2–5.4)
SEGMENTED NEUTROPHILS ABSOLUTE COUNT: 6.8 K/CU MM
SEGMENTED NEUTROPHILS RELATIVE PERCENT: 73.3 % (ref 36–66)
SODIUM BLD-SCNC: 140 MMOL/L (ref 135–145)
TOTAL IMMATURE NEUTOROPHIL: 0.02 K/CU MM
TOTAL NUCLEATED RBC: 0 K/CU MM
TRIGL SERPL-MCNC: 75 MG/DL
WBC # BLD: 9.3 K/CU MM (ref 4–10.5)

## 2023-04-07 PROCEDURE — 6370000000 HC RX 637 (ALT 250 FOR IP): Performed by: NURSE PRACTITIONER

## 2023-04-07 PROCEDURE — 93306 TTE W/DOPPLER COMPLETE: CPT

## 2023-04-07 PROCEDURE — 94640 AIRWAY INHALATION TREATMENT: CPT

## 2023-04-07 PROCEDURE — 94761 N-INVAS EAR/PLS OXIMETRY MLT: CPT

## 2023-04-07 PROCEDURE — 6370000000 HC RX 637 (ALT 250 FOR IP): Performed by: EMERGENCY MEDICINE

## 2023-04-07 PROCEDURE — 85025 COMPLETE CBC W/AUTO DIFF WBC: CPT

## 2023-04-07 PROCEDURE — 6360000002 HC RX W HCPCS: Performed by: NURSE PRACTITIONER

## 2023-04-07 PROCEDURE — 80061 LIPID PANEL: CPT

## 2023-04-07 PROCEDURE — 80048 BASIC METABOLIC PNL TOTAL CA: CPT

## 2023-04-07 PROCEDURE — 2580000003 HC RX 258: Performed by: NURSE PRACTITIONER

## 2023-04-07 RX ORDER — AMLODIPINE BESYLATE 5 MG/1
5 TABLET ORAL DAILY
Qty: 30 TABLET | Refills: 3 | Status: SHIPPED | OUTPATIENT
Start: 2023-04-08

## 2023-04-07 RX ORDER — LOSARTAN POTASSIUM 50 MG/1
50 TABLET ORAL DAILY
Qty: 30 TABLET | Refills: 3 | Status: SHIPPED | OUTPATIENT
Start: 2023-04-08

## 2023-04-07 RX ADMIN — ACETAMINOPHEN 650 MG: 325 TABLET ORAL at 03:06

## 2023-04-07 RX ADMIN — ENOXAPARIN SODIUM 40 MG: 100 INJECTION SUBCUTANEOUS at 09:02

## 2023-04-07 RX ADMIN — GUAIFENESIN 600 MG: 600 TABLET, EXTENDED RELEASE ORAL at 09:02

## 2023-04-07 RX ADMIN — AMLODIPINE BESYLATE 5 MG: 5 TABLET ORAL at 09:02

## 2023-04-07 RX ADMIN — ACETAMINOPHEN 650 MG: 325 TABLET ORAL at 12:03

## 2023-04-07 RX ADMIN — SODIUM CHLORIDE, PRESERVATIVE FREE 10 ML: 5 INJECTION INTRAVENOUS at 09:01

## 2023-04-07 RX ADMIN — SERTRALINE HYDROCHLORIDE 100 MG: 50 TABLET ORAL at 09:02

## 2023-04-07 RX ADMIN — BUDESONIDE AND FORMOTEROL FUMARATE DIHYDRATE 2 PUFF: 160; 4.5 AEROSOL RESPIRATORY (INHALATION) at 07:55

## 2023-04-07 RX ADMIN — LOSARTAN POTASSIUM 50 MG: 25 TABLET, FILM COATED ORAL at 09:02

## 2023-04-07 RX ADMIN — TIOTROPIUM BROMIDE INHALATION SPRAY 2 PUFF: 3.12 SPRAY, METERED RESPIRATORY (INHALATION) at 07:55

## 2023-04-07 ASSESSMENT — PAIN DESCRIPTION - ORIENTATION: ORIENTATION: ANTERIOR;MID

## 2023-04-07 ASSESSMENT — PAIN SCALES - GENERAL
PAINLEVEL_OUTOF10: 7
PAINLEVEL_OUTOF10: 6

## 2023-04-07 ASSESSMENT — PAIN DESCRIPTION - DESCRIPTORS
DESCRIPTORS: ACHING
DESCRIPTORS: ACHING

## 2023-04-07 ASSESSMENT — PAIN DESCRIPTION - LOCATION
LOCATION: HEAD
LOCATION: HEAD

## 2023-04-07 NOTE — PROGRESS NOTES
I personally saw the patient and performed a substantive portion of the visit including all aspects of the medical decision making. She has been dealing with her sinuses. Taking sudafed. Feels chest pain and some shortness of breath. Also feels like she is retaining fluid. No acute distress  S1s2 rrrr  Cta b/l bs+  Abd soft nt nd  No pitting edema    -Hypertensive urgency: Placed on as needed labetalol and hydralazine. Attempt to bring down systolic blood pressure in 10 to 20% in the first 24 hours. Cardiology recommends starting amlodipine and losartan.
Patient discharged at this time. AVS reviewed with patient, including new medications, their uses and side effects. Patient expressed understanding of medications using teach-back method. All up coming appointments, if any, reviewed. Tele and PIV reviewed. Patient taken to car by myself via wheelchair, with all personal possessions in hand.
hours ending 04/07/23 1527  Physical Exam:  Constitutional:  Well developed, Well nourished, No acute distress, Non-toxic appearance. HENT:  Normocephalic, Atraumatic, Bilateral external ears normal, Oropharynx moist, No oral exudates, Nose normal. Neck- Normal range of motion, No tenderness, Supple, No stridor. Eyes:  PERRL, EOMI, Conjunctiva normal, No discharge. Respiratory:  Normal breath sounds, No respiratory distress, No wheezing, No chest tenderness. Cardiovascular:  Normal heart rate, Normal rhythm, No murmurs, No rubs, No gallops, JVP not elevated  Abdomen/GI:  Bowel sounds normal, Soft, No tenderness, No masses, No pulsatile masses. Musculoskeletal:  Intact distal pulses, No edema, No tenderness, No cyanosis, No clubbing. Good range of motion in all major joints. No tenderness to palpation or major deformities noted. Back- No tenderness. Integument:  Warm, Dry, No erythema, No rash. Lymphatic:  No lymphadenopathy noted. Neurologic:  Alert & oriented x 3, Normal motor function, Normal sensory function, No focal deficits noted.    Psychiatric:  Affect  and  Mood :no change    Medications:    guaiFENesin  600 mg Oral BID    amLODIPine  5 mg Oral Daily    losartan  50 mg Oral Daily    montelukast  10 mg Oral Nightly    sertraline  100 mg Oral Daily    sodium chloride flush  5-40 mL IntraVENous 2 times per day    enoxaparin  40 mg SubCUTAneous Daily    budesonide-formoterol  2 puff Inhalation BID    tiotropium  2 puff Inhalation Daily      sodium chloride       hydrALAZINE, albuterol sulfate HFA, sodium chloride flush, sodium chloride, ondansetron **OR** ondansetron, polyethylene glycol, acetaminophen **OR** acetaminophen, ipratropium-albuterol, hydrOXYzine HCl    Lab Data:  CBC:   Recent Labs     04/06/23  1037 04/07/23  0600   WBC 7.0 9.3   HGB 14.2 13.7   HCT 44.2 42.3   MCV 88.6 86.5    239     BMP:   Recent Labs     04/06/23  1037 04/07/23  0600    140   K 4.7 4.4   
acute intracranial hemorrhage or extraaxial fluid collection. Grey-white differentiation is maintained. No evidence of mass, mass effect or midline shift. No evidence of hydrocephalus. There are areas of hypoattenuation in the periventricular and subcortical white matter, which is nonspecific, but may represent chronic microvasvular ischemic change. There is prominence of the ventricles and sulci due to global parenchymal volume loss. Scattered atherosclerosis of the intracranial vasculature. ORBITS: A chronic defect is seen involving the medial wall the right orbit. No acute abnormality seen the orbits. SINUSES:  Scattered mucosal thickening of the ethmoid sinuses. The mastoid air cells demonstrate no acute abnormality. SOFT TISSUES/SKULL: No acute abnormality of the visualized skull or soft tissues. CTA NECK: AORTIC ARCH/ARCH VESSELS: No dissection or arterial injury. No significant stenosis of the brachiocephalic or subclavian arteries. CAROTID ARTERIES: No dissection, arterial injury, or hemodynamically significant stenosis by NASCET criteria. VERTEBRAL ARTERIES: No dissection, arterial injury, or significant stenosis. SOFT TISSUES: There is a 6 mm nodule within the right upper lobe abutting the mediastinum (axial series 308 image 38). No acute abnormality is seen within the visualized mediastinum. There is asymmetric prominence involving the right lingual tonsil without convincing evidence of a discrete mass. BONES: No acute osseous abnormality. CTA HEAD: ANTERIOR CIRCULATION: Atherosclerosis of the internal carotid arteries bilaterally. No significant stenosis seen of the internal carotid arteries. No significant stenosis seen of the anterior cerebral or middle cerebral arteries. No aneurysm identified. POSTERIOR CIRCULATION: No significant stenosis of the vertebral, basilar, or posterior cerebral arteries. No aneurysm. OTHER: No dural venous sinus thrombosis on this non-dedicated study.      1. No

## 2023-04-07 NOTE — DISCHARGE SUMMARY
I BMI 33.75  --Counseled on lifestyle modifications including diet modification and exercise  --Follow-up PCP for longitudinal care    #Former smoker  --Reports 40 year pack history, quit July 2022    Patient is medically cleared for DC    The patient expressed appropriate understanding of, and agreement with the discharge recommendations, medications, and plan.      Consults this admission:  IP CONSULT TO CARDIOLOGY    Discharge Diagnosis:   Hypertensive urgency    Hypertensive Emergency    Discharge Instruction:   Follow up appointments:   Cardiology    Primary care physician: Vivien Krabbe, MD within 2 weeks  Diet: cardiac diet   Activity: activity as tolerated  Disposition: Discharged to:   [x]Home, [][facility], []SNF, []Acute Rehab, []Hospice     Condition on discharge: Stable  Labs and Tests to be Followed up as an outpatient by PCP or Specialist: Follow up on Ct of chest in 6-12 months  to follow up on 6mm nodule in right upper lobe    Discharge Medications:        Medication List        START taking these medications      amLODIPine 5 MG tablet  Commonly known as: NORVASC  Take 1 tablet by mouth daily  Start taking on: April 8, 2023     losartan 50 MG tablet  Commonly known as: COZAAR  Take 1 tablet by mouth daily  Start taking on: April 8, 2023            CONTINUE taking these medications      AeroChamber MV Misc  1 actuation by Does not apply route in the morning and at bedtime     albuterol sulfate  (90 Base) MCG/ACT inhaler  Commonly known as: PROVENTIL;VENTOLIN;PROAIR     ascorbic acid 500 MG tablet  Commonly known as: VITAMIN C     benzonatate 100 MG capsule  Commonly known as: TESSALON     Breztri Aerosphere 160-9-4.8 MCG/ACT Aero  Generic drug: Budeson-Glycopyrrol-Formoterol     fluticasone 50 MCG/ACT nasal spray  Commonly known as: FLONASE     guaiFENesin 600 MG extended release tablet  Commonly known as: MUCINEX     hydrOXYzine HCl 25 MG tablet  Commonly known as: ATARAX     Magnesium Gluconate 550

## 2023-04-07 NOTE — DISCHARGE INSTRUCTIONS
Please ensure that you follow up with your PCP and the cardiologist  You have been started on 2 new medications for your blood pressure, please take them as prescribed and monitor your BP  If symptoms recur or worsen, please return to nearest ED

## 2023-04-07 NOTE — PLAN OF CARE
Problem: Discharge Planning  Goal: Discharge to home or other facility with appropriate resources  Outcome: Adequate for Discharge     Problem: Pain  Goal: Verbalizes/displays adequate comfort level or baseline comfort level  Outcome: Adequate for Discharge

## 2023-04-17 ENCOUNTER — OFFICE VISIT (OUTPATIENT)
Dept: CARDIOLOGY CLINIC | Age: 63
End: 2023-04-17
Payer: COMMERCIAL

## 2023-04-17 ENCOUNTER — NURSE ONLY (OUTPATIENT)
Dept: CARDIOLOGY CLINIC | Age: 63
End: 2023-04-17

## 2023-04-17 VITALS
HEART RATE: 103 BPM | WEIGHT: 225 LBS | BODY MASS INDEX: 34.1 KG/M2 | SYSTOLIC BLOOD PRESSURE: 126 MMHG | HEIGHT: 68 IN | DIASTOLIC BLOOD PRESSURE: 74 MMHG

## 2023-04-17 DIAGNOSIS — R00.0 TACHYCARDIA: Primary | ICD-10-CM

## 2023-04-17 DIAGNOSIS — Z87.891 SMOKING HISTORY: ICD-10-CM

## 2023-04-17 DIAGNOSIS — R51.9 ACUTE NONINTRACTABLE HEADACHE, UNSPECIFIED HEADACHE TYPE: ICD-10-CM

## 2023-04-17 DIAGNOSIS — F41.9 ANXIETY: ICD-10-CM

## 2023-04-17 DIAGNOSIS — I10 PRIMARY HYPERTENSION: ICD-10-CM

## 2023-04-17 PROCEDURE — 3078F DIAST BP <80 MM HG: CPT | Performed by: INTERNAL MEDICINE

## 2023-04-17 PROCEDURE — 3074F SYST BP LT 130 MM HG: CPT | Performed by: INTERNAL MEDICINE

## 2023-04-17 PROCEDURE — 93000 ELECTROCARDIOGRAM COMPLETE: CPT | Performed by: INTERNAL MEDICINE

## 2023-04-17 PROCEDURE — 99214 OFFICE O/P EST MOD 30 MIN: CPT | Performed by: INTERNAL MEDICINE

## 2023-04-17 NOTE — PROGRESS NOTES
(PROVENTIL;VENTOLIN;PROAIR) 108 (90 Base) MCG/ACT inhaler Inhale 2 puffs into the lungs every 6 hours as needed for Wheezing or Shortness of Breath      benzonatate (TESSALON) 100 MG capsule Take 1 capsule by mouth Three times daily as needed for Cough      guaiFENesin (MUCINEX) 600 MG extended release tablet Take one tablet by mouth two times per day as needed for congestion       No current facility-administered medications for this visit. Allergies:   Amoxicillin    Patient History:  Past Medical History:   Diagnosis Date    Anxiety     Asthma     Depression     Recurrent sinus infections      Past Surgical History:   Procedure Laterality Date    BREAST BIOPSY Left     lcis    CYST REMOVAL      thigh 2/2015    TONSILLECTOMY      TUBAL LIGATION       Family History   Problem Relation Age of Onset    High Blood Pressure Mother     Breast Cancer Mother 80    Ovarian Cancer Neg Hx      Social History     Tobacco Use    Smoking status: Former     Packs/day: 1.00     Years: 44.00     Pack years: 44.00     Types: Cigarettes    Smokeless tobacco: Not on file   Substance Use Topics    Alcohol use: No        Review of Systems:   Constitutional: No Fever or Weight Loss   Eyes: No Decreased Vision  ENT: No Headaches, Hearing Loss or Vertigo  Cardiovascular: as per note above   Respiratory: No cough or wheezing and as per note above.    Gastrointestinal: No abdominal pain, appetite loss, blood in stools, constipation, diarrhea or heartburn  Genitourinary: No dysuria, trouble voiding, or hematuria  Musculoskeletal:  denies any new  joint aches , swelling  or pain   Integumentary: No rash or pruritis  Neurological: No TIA or stroke symptoms  Psychiatric: No anxiety or depression  Endocrine: No malaise, fatigue or temperature intolerance  Hematologic/Lymphatic: No bleeding problems, blood clots or swollen lymph nodes  Allergic/Immunologic: No nasal congestion or hives    Objective:      Physical Exam:  /74 (Site: Left

## 2023-04-17 NOTE — ASSESSMENT & PLAN NOTE
I think the right thing high blood pressures are due to anxiety or panic attacks? Echo shows no significant valvular disease.   Encouraged to see  Dr Marisol Faulkner PCP about starting medication for anxiety

## 2023-04-17 NOTE — PROGRESS NOTES
48 hour holter monitor [email or web address] for tachycardia Serial # [id number] . Instructed patient on monitor and proper use. Instructed on diary. When to remove and bring it back. Must leave the holter monitor on  without removing for the duration of time ordered. Answered all questions the patient had. Instructed patient to call [facility] at [phone] with any questions or concerns with the monitor. Applied by Jose Amin.

## 2023-04-17 NOTE — ASSESSMENT & PLAN NOTE
Significant headaches and abnormality with high blood pressures.   Encouraged to take blood pressure medication at home for now continue amlodipine 5 mg daily and losartan 50 mg daily use hydroxyzine as needed

## 2023-05-01 ENCOUNTER — PROCEDURE VISIT (OUTPATIENT)
Dept: CARDIOLOGY CLINIC | Age: 63
End: 2023-05-01
Payer: COMMERCIAL

## 2023-05-01 DIAGNOSIS — R51.9 ACUTE NONINTRACTABLE HEADACHE, UNSPECIFIED HEADACHE TYPE: ICD-10-CM

## 2023-05-01 DIAGNOSIS — R94.31 ABNORMAL EKG: Primary | ICD-10-CM

## 2023-05-01 DIAGNOSIS — I10 PRIMARY HYPERTENSION: ICD-10-CM

## 2023-05-01 DIAGNOSIS — R00.0 TACHYCARDIA: ICD-10-CM

## 2023-05-01 DIAGNOSIS — F41.9 ANXIETY: ICD-10-CM

## 2023-05-01 PROCEDURE — 93015 CV STRESS TEST SUPVJ I&R: CPT | Performed by: INTERNAL MEDICINE

## 2023-05-05 ENCOUNTER — TELEPHONE (OUTPATIENT)
Dept: CARDIOLOGY CLINIC | Age: 63
End: 2023-05-05

## 2023-05-05 NOTE — TELEPHONE ENCOUNTER
Patient wants results of tests and blood pressure medication is \"wiping her out \" ,is that normal ? -per patient . Please call ASAP

## 2023-05-08 ENCOUNTER — OFFICE VISIT (OUTPATIENT)
Dept: CARDIOLOGY CLINIC | Age: 63
End: 2023-05-08
Payer: COMMERCIAL

## 2023-05-08 VITALS
SYSTOLIC BLOOD PRESSURE: 118 MMHG | HEIGHT: 70 IN | WEIGHT: 224.6 LBS | BODY MASS INDEX: 32.16 KG/M2 | HEART RATE: 98 BPM | DIASTOLIC BLOOD PRESSURE: 60 MMHG

## 2023-05-08 DIAGNOSIS — R00.0 TACHYCARDIA: ICD-10-CM

## 2023-05-08 DIAGNOSIS — F41.9 ANXIETY: ICD-10-CM

## 2023-05-08 DIAGNOSIS — I47.1 SVT (SUPRAVENTRICULAR TACHYCARDIA) (HCC): ICD-10-CM

## 2023-05-08 DIAGNOSIS — I10 PRIMARY HYPERTENSION: Primary | ICD-10-CM

## 2023-05-08 DIAGNOSIS — Z87.891 SMOKING HISTORY: ICD-10-CM

## 2023-05-08 PROCEDURE — 3078F DIAST BP <80 MM HG: CPT | Performed by: NURSE PRACTITIONER

## 2023-05-08 PROCEDURE — 3074F SYST BP LT 130 MM HG: CPT | Performed by: NURSE PRACTITIONER

## 2023-05-08 PROCEDURE — 99214 OFFICE O/P EST MOD 30 MIN: CPT | Performed by: NURSE PRACTITIONER

## 2023-05-08 ASSESSMENT — ENCOUNTER SYMPTOMS
SHORTNESS OF BREATH: 0
COUGH: 0

## 2023-05-08 NOTE — PROGRESS NOTES
80 highest heart was 137 patient was noted to have short runs of SVT 99 supraventricular ectopy noted 181 ventricular ectopy which is 0.07%. Episodes of sinus tachycardia noted with heart rate in 100s and short run of SVT noted on 2 or 3 occasions of 4-5 beats without any reported symptoms longest SVT run was for 5-6 beats with a heart rate of 140. Clinical correlation needed        ASSESSMENT/PLAN:  Tachycardia  Holter monitor shows very low burden of PVC and SVT. She does have some SVT. But short episodes. Primary hypertension  controlled  Significant headaches and abnormality with high blood pressures. Bring machine in for verification. Encouraged to take blood pressure medication at home for now. continue amlodipine 5 mg in the morning and losartan 50 mg nightly. use hydroxyzine as needed. We discussed HTN as being silent and long term effects and body adjusting to medications. Encouraged to start cardiac exercises. Smoking history   He was encouraged and congratulated     Anxiety   I think the right thing high blood pressures are due to anxiety or panic attacks? Echo shows no significant valvular disease. Dyslipidemia   All available lab work was reviewed. Patient was advised to repeat lab work before next visit. Necessary orders were placed , instructions given by myself         Counseled extensively and medication compliance urged. We discussed that for the  prevention of ASCVD our  goal is aggressive risk modification. Patient is encouraged to exercise if they can , educated about  brisk walk for 30 minutes  at least 3 to 4 times a week if there are no physical limitations  Various goals were discussed and questions answered. Continue current medications. Appropriate prescriptions are addressed and refills ordered. Questions answered and patient verbalizes understanding. Call for any problems, questions, or concerns. Greater than 60 % of time spent counseling besides reviewing data

## 2023-06-01 ENCOUNTER — TELEPHONE (OUTPATIENT)
Dept: CARDIOLOGY CLINIC | Age: 63
End: 2023-06-01

## 2023-06-01 NOTE — TELEPHONE ENCOUNTER
Pt called stating she is having headaches with her blood pressure medication.  Wanted to see if there is something she can do to help w/headaches

## 2023-06-20 ENCOUNTER — TELEPHONE (OUTPATIENT)
Dept: CARDIOLOGY CLINIC | Age: 63
End: 2023-06-20

## 2023-06-20 NOTE — TELEPHONE ENCOUNTER
Per email from [address],   this patient with her bill. her [employer] company is telling her that her bills haven't been submitted for payment. It has   Adjustments on it, but I'm not sure about payments from ins company. I reviewed the account and all the charges have been processed by her insurance and the charges were applied to the deduct. Or was a co-pay. I called the patient, left a vm with this information and if she has any questions, she can call back.

## 2023-06-29 ENCOUNTER — NURSE ONLY (OUTPATIENT)
Dept: CARDIOLOGY CLINIC | Age: 63
End: 2023-06-29

## 2023-06-29 VITALS
OXYGEN SATURATION: 95 % | DIASTOLIC BLOOD PRESSURE: 80 MMHG | HEIGHT: 68 IN | HEART RATE: 95 BPM | SYSTOLIC BLOOD PRESSURE: 124 MMHG | WEIGHT: 219 LBS | BODY MASS INDEX: 33.19 KG/M2

## 2023-06-29 DIAGNOSIS — I10 PRIMARY HYPERTENSION: Primary | ICD-10-CM

## 2023-08-07 RX ORDER — AMLODIPINE BESYLATE 5 MG/1
5 TABLET ORAL DAILY
Qty: 90 TABLET | Refills: 0 | Status: SHIPPED | OUTPATIENT
Start: 2023-08-07

## 2023-10-16 ENCOUNTER — HOSPITAL ENCOUNTER (OUTPATIENT)
Dept: CT IMAGING | Age: 63
Discharge: HOME | End: 2023-10-16
Attending: INTERNAL MEDICINE
Payer: COMMERCIAL

## 2023-10-16 DIAGNOSIS — Z87.891 PERSONAL HISTORY OF TOBACCO USE, PRESENTING HAZARDS TO HEALTH: ICD-10-CM

## 2023-10-16 PROCEDURE — 71271 CT THORAX LUNG CANCER SCR C-: CPT

## 2023-11-06 RX ORDER — AMLODIPINE BESYLATE 5 MG/1
5 TABLET ORAL DAILY
Qty: 30 TABLET | Refills: 0 | Status: SHIPPED | OUTPATIENT
Start: 2023-11-06

## 2023-11-06 RX ORDER — AMLODIPINE BESYLATE 5 MG/1
TABLET ORAL
Qty: 90 TABLET | Refills: 0 | OUTPATIENT
Start: 2023-11-06

## 2023-11-07 ENCOUNTER — TELEPHONE (OUTPATIENT)
Dept: CARDIOLOGY CLINIC | Age: 63
End: 2023-11-07

## 2023-11-07 NOTE — TELEPHONE ENCOUNTER
Called pt and made appointment for:  12/7/23 at 8 AM    PT requested to not be called again due to familial circumstances

## 2023-12-06 ASSESSMENT — ENCOUNTER SYMPTOMS
COUGH: 0
SHORTNESS OF BREATH: 0

## 2023-12-07 ENCOUNTER — OFFICE VISIT (OUTPATIENT)
Dept: CARDIOLOGY CLINIC | Age: 63
End: 2023-12-07

## 2023-12-07 VITALS
SYSTOLIC BLOOD PRESSURE: 124 MMHG | OXYGEN SATURATION: 96 % | HEART RATE: 98 BPM | BODY MASS INDEX: 32.86 KG/M2 | WEIGHT: 216.8 LBS | DIASTOLIC BLOOD PRESSURE: 82 MMHG | HEIGHT: 68 IN

## 2023-12-07 DIAGNOSIS — I47.10 SVT (SUPRAVENTRICULAR TACHYCARDIA): ICD-10-CM

## 2023-12-07 DIAGNOSIS — R00.0 TACHYCARDIA: ICD-10-CM

## 2023-12-07 DIAGNOSIS — Z72.0 TOBACCO ABUSE: ICD-10-CM

## 2023-12-07 DIAGNOSIS — I10 PRIMARY HYPERTENSION: Primary | ICD-10-CM

## 2023-12-07 RX ORDER — NICOTINE 21 MG/24HR
1 PATCH, TRANSDERMAL 24 HOURS TRANSDERMAL DAILY
Qty: 14 PATCH | Refills: 2 | Status: SHIPPED | OUTPATIENT
Start: 2023-12-07

## 2023-12-07 RX ORDER — AMLODIPINE BESYLATE 5 MG/1
5 TABLET ORAL DAILY
Qty: 90 TABLET | Refills: 3 | Status: SHIPPED | OUTPATIENT
Start: 2023-12-07

## 2023-12-07 RX ORDER — HYDROXYZINE HYDROCHLORIDE 25 MG/1
25 TABLET, FILM COATED ORAL 3 TIMES DAILY PRN
COMMUNITY

## 2023-12-07 RX ORDER — LOSARTAN POTASSIUM 100 MG/1
100 TABLET ORAL DAILY
Qty: 90 TABLET | Refills: 3 | Status: SHIPPED | OUTPATIENT
Start: 2023-12-07

## 2023-12-07 NOTE — PATIENT INSTRUCTIONS
Thank you for allowing us to care for you today! We want to ensure we can follow your treatment plan and we strive to give you the best outcomes and experience possible. If you ever have a life threatening emergency and call 911 - for an ambulance (EMS)   Our providers can only care for you at:   [facility] or [facility]. Even if you have someone take you or you drive yourself we can only care for you in a [facility]. Our providers are not setup at the other healthcare locations! **It is YOUR responsibilty to bring medication bottles and/or updated medication list to [facility]. This will allow us to better serve you and all your healthcare needs**  [facility] Laboratory Locations - No appointment necessary. Sites open Monday to Friday. Call your preferred location for test preparation, business   hours and other information you need. [employer] accepts 's. [address] [place] [address]. [facility]. [facility]. [place], [address] [facility]  Phone: [phone]     We are committed to providing you the best care possible. If you receive a survey after visiting one of our offices, please take time to share your experience concerning your physician office visit. These surveys are confidential and no health information about you is shared. We are eager to improve for you and we are counting on your feedback to help make that happen.

## 2023-12-07 NOTE — PROGRESS NOTES
a    CARDIOLOGY  NOTE    2023    Rosibel Madrid (:  1960) is a 61 y.o. female,an established patient with Dr. Breanna Gibson, here for evaluation of the following chief complaint(s):  Follow-up (Pt denies any new cardiac sx.)        SUBJECTIVE/OBJECTIVE:    JAYE Garcia has Past medical history as noted below. Has been under iminse stress. Son had AAA dissection and multiple strokes and grandson was shot recently. Her mom, who is 80, had a stroke. Therefore she restarted smoking. Patient has a history of anxiety asthma and hypertension. Previously found patient using Sudafed for allergies which was causing hypertension to be accelerated. Blood pressure was acceptable with stopping Sudafed. Patient is a smoker, 0.5 ppd. Patient denies issues obtaining or taking medications. Patient is active and does not do organized exercise. Patient denies chest pain, shortness of breath, palpitations, dizziness, orthopnea, lower leg swelling, or syncope. Review of Systems   Constitutional:  Negative for fatigue and fever. Respiratory:  Negative for cough and shortness of breath. Cardiovascular:  Negative for chest pain, palpitations and leg swelling. Musculoskeletal:  Negative for arthralgias and gait problem. Neurological:  Negative for dizziness, syncope, weakness, light-headedness and headaches. Vitals:    23 0811   BP: 124/82   Site: Left Upper Arm   Position: Sitting   Cuff Size: Medium Adult   Pulse: 98   SpO2: 96%   Weight: 98.3 kg (216 lb 12.8 oz)   Height: 1.727 m (5' 8\")       Wt Readings from Last 3 Encounters:   23 98.3 kg (216 lb 12.8 oz)   10/25/23 96.2 kg (212 lb)   23 99.3 kg (219 lb)       BP Readings from Last 3 Encounters:   23 124/82   23 124/80   06/15/23 129/84       Prior to Admission medications    Medication Sig Start Date End Date Taking?  Authorizing Provider   hydrOXYzine HCl (ATARAX) 25 MG tablet Take 1 tablet by mouth 3 times daily as

## 2024-02-05 RX ORDER — NICOTINE 21 MG/24HR
PATCH, TRANSDERMAL 24 HOURS TRANSDERMAL
Qty: 14 PATCH | Refills: 2 | OUTPATIENT
Start: 2024-02-05

## 2025-05-20 ENCOUNTER — HOSPITAL ENCOUNTER (OUTPATIENT)
Dept: GENERAL RADIOLOGY | Age: 65
Discharge: HOME | End: 2025-05-20
Payer: COMMERCIAL

## 2025-05-20 DIAGNOSIS — M25.562 LEFT KNEE PAIN, UNSPECIFIED CHRONICITY: Primary | ICD-10-CM

## 2025-05-20 DIAGNOSIS — M25.562 PAIN, JOINT, KNEE, LEFT: ICD-10-CM

## 2025-05-20 PROCEDURE — 73564 X-RAY EXAM KNEE 4 OR MORE: CPT

## 2025-05-21 ENCOUNTER — RESULTS FOLLOW-UP (OUTPATIENT)
Dept: ORTHOPEDIC SURGERY | Age: 65
End: 2025-05-21

## 2025-05-21 ENCOUNTER — OFFICE VISIT (OUTPATIENT)
Dept: ORTHOPEDIC SURGERY | Age: 65
End: 2025-05-21
Payer: COMMERCIAL

## 2025-05-21 VITALS
BODY MASS INDEX: 32.74 KG/M2 | WEIGHT: 216 LBS | OXYGEN SATURATION: 96 % | HEART RATE: 107 BPM | RESPIRATION RATE: 14 BRPM | HEIGHT: 68 IN

## 2025-05-21 DIAGNOSIS — M25.562 LEFT KNEE PAIN, UNSPECIFIED CHRONICITY: ICD-10-CM

## 2025-05-21 DIAGNOSIS — M22.2X2 PATELLOFEMORAL SYNDROME OF LEFT KNEE: Primary | ICD-10-CM

## 2025-05-21 DIAGNOSIS — M76.32 ILIOTIBIAL BAND SYNDROME OF LEFT SIDE: ICD-10-CM

## 2025-05-21 DIAGNOSIS — M17.12 PRIMARY OSTEOARTHRITIS OF ONE KNEE, LEFT: ICD-10-CM

## 2025-05-21 PROCEDURE — 99203 OFFICE O/P NEW LOW 30 MIN: CPT | Performed by: PHYSICIAN ASSISTANT

## 2025-05-21 RX ORDER — MELOXICAM 15 MG/1
15 TABLET ORAL DAILY PRN
Qty: 30 TABLET | Refills: 1 | Status: SHIPPED | OUTPATIENT
Start: 2025-05-21

## 2025-05-21 NOTE — PROGRESS NOTES
ORTHOPEDIC SURGERY OFFICE NOTE  CHIEF COMPLAINT:  Chief Complaint   Patient presents with    Knee Pain     Left knee        HISTORY OF PRESENT ILLNESS:  Magdalena Mayfield is a 64 y.o. female Magdalena Mayfield is a 64 y.o. y.o. year old female who complains of Left knee pain and giving out, pain located lateral side of the knee, popping sensation, stiffness, and swelling. Patient states that she has been having a increased in swelling from the lateral side of the knee and radiates up into the hip.    64 female presenting with complaints of left lateral knee pain, swelling.  States is worsened over the last month.  No new injury or tenderness.  Worsened with crouching and going up and down stairs.  Recently seen by primary care and is concern for possible LCL pathology.  Has pain over the anterior aspect of the leg in the quadricep and down to the left lateral aspect of the leg.  Also has some intermittent hip pain.  Has no significant history of orthopedic issues.  No history of osteoarthritis of the hip and/or knee.  No significant erythema or swelling.  No significant night pain.  Patient has been using cane which is outside of her normal.    PAST MEDICAL HISTORY:  Past Medical History:   Diagnosis Date    Anxiety     Asthma     Depression     Recurrent sinus infections        PAST SURGICAL HISTORY:  Past Surgical History:   Procedure Laterality Date    BREAST BIOPSY Left     lcis    CYST REMOVAL      thigh 2/2015    TONSILLECTOMY      TUBAL LIGATION         SOCIAL HISTORY:  Social History     Occupational History    Not on file   Tobacco Use    Smoking status: Every Day     Current packs/day: 0.50     Average packs/day: 0.5 packs/day for 44.0 years (22.0 ttl pk-yrs)     Types: Cigarettes     Passive exposure: Current    Smokeless tobacco: Not on file    Tobacco comments:     Pt began smoking again in beginning of October due to stress induced by son. Smokes .5 pack a day   Substance and Sexual Activity    Alcohol use:

## 2025-05-21 NOTE — PATIENT INSTRUCTIONS
Reddie brace  Mobic/Voltaren gel sent to pharmacy  PT ordered  Follow up in 6 weeks    Out patient Physical Therapy has been ordered by your provider. [facility] will call you to set up therapy. If you have not heard from them within 24-48 hours of today's appointment, please reach out to them at [phone].    We are committed to providing you the best care possible.  If you receive a survey after visiting one of our offices, please take time to share your experience concerning your physician office visit.  These surveys are confidential and no health information about you is shared.  We are eager to improve for you and we are counting on your feedback to help make that happen.

## 2025-05-21 NOTE — PROGRESS NOTES
Magdalena Mayfield is a 64 y.o. y.o. year old female who complains of Left knee pain and giving out, pain located lateral side of the knee, popping sensation, stiffness, and swelling. Patient states that she has been having a increased in swelling from the lateral side of the knee and radiates up into the hip.

## 2025-05-28 ENCOUNTER — TELEPHONE (OUTPATIENT)
Dept: ORTHOPEDIC SURGERY | Age: 65
End: 2025-05-28

## 2025-05-28 NOTE — TELEPHONE ENCOUNTER
Patient called in stating that she was fitted for a knee brace and that the Voltaren gel has caused possible large hard spots on the posterior part of the knee. Patient is requesting a prednisone sent into her pharmacy.   Patient states that her PCP Dr. Cadet sent her in some steroid medication and it seem to help with the knee earlier in the month of May.    Pharmacy: [facility]

## 2025-05-29 NOTE — TELEPHONE ENCOUNTER
Patient states that it is a big hard knot on the posterior part of the knee.  Patient is having increased in fluid cyst sac on the medical side of the knee. Patient states that she is having extreme pain and that is now moving into the left knee. Patient was given a 6 pack of steroid medication that was given by Dr. Cadet.     Patient doesn't have rash.     Patient states that she has been babying the knee when she is using the velcro knee. Using icing and elevation for the leg. Usage of advil and mobic. Patient was told not to take the medication together. Patient may take the Tylenol.    Patient is wanting the steroid sent into the pharmacy. [facility] on [address]

## 2025-06-06 ENCOUNTER — HOSPITAL ENCOUNTER (OUTPATIENT)
Dept: PHYSICAL THERAPY | Age: 65
Setting detail: THERAPIES SERIES
Discharge: HOME | End: 2025-06-06
Payer: COMMERCIAL

## 2025-06-06 PROCEDURE — 97110 THERAPEUTIC EXERCISES: CPT

## 2025-06-06 PROCEDURE — 97161 PT EVAL LOW COMPLEX 20 MIN: CPT

## 2025-06-06 ASSESSMENT — PAIN DESCRIPTION - PAIN TYPE: TYPE: ACUTE PAIN

## 2025-06-06 ASSESSMENT — PAIN DESCRIPTION - LOCATION: LOCATION: KNEE

## 2025-06-06 ASSESSMENT — PAIN DESCRIPTION - ORIENTATION: ORIENTATION: LEFT

## 2025-06-06 NOTE — FLOWSHEET NOTE
Outpatient Physical Therapy  [place]           [x] Phone: [phone]   Fax: [phone]  [place]           [] Phone: [phone]   Fax: [phone]        Physical Therapy Daily Treatment Note  Date:  2025    Patient Name:  Magdalena Mayfield    :  1960  MRN: [id number]  Restrictions/Precautions: No data recorded      Diagnosis:   Left knee pain, unspecified chronicity [M25.562]  Patellofemoral syndrome of left knee [M22.2X2]  Iliotibial band syndrome of left side [M76.32]  Primary osteoarthritis of one knee, left [M17.12] Diagnosis: L knee pain ,PFPS  Date of Injury/Surgery:   Treatment Diagnosis:  L knee pain , welling, weakness, limited gait and ROM  Insurance/Certification information: [employer] ( $40 copay)  Referring Physician:  Rodney Abdi PA     PCP: Randy Cadet MD  Next Doctor Visit:  25  Plan of care signed (Y/N):   no  Outcome Measure: LEFS 19 pts  Visit# / total visits:  -16  Pain level: 3-9 /10  L knee , PFP  Goals:     Patient goals: less l knee pain and better activity tolerance  Short term goals  Time Frame for Short term goals: 4 weeks  1. ind with HEP for knee  2. L knee full ext PROM  3. L knee girth equal to R at joint line  4. walking household dist with no cane needed  5. avg 4/10 or less L knee pain wiht typical ADLs  Long Term Goals  Time Frame for Long Term Goals: 6-8 weeks  1.  MMT L knee 4/5  for better ADL and functional ability  2. L knee PROM flex 130 ext 0  3.walking with no AD community dist 2/10 or less  4. stairs reciprocally 1 flight with rail  5. LEFS improved to 9pt or less      Summary of Evaluation:  Assessment: Pt appears with L knee swelling manly in the ant lat area and the popliteal space. She has no specific onset or mechanism of injury and reports that her R knee is also pain due to favoring her L knee. She appears with PFP type symptoms or possible bakers' cyst.  She appears with limited Rom and strength as well. Her

## 2025-06-06 NOTE — PROGRESS NOTES
Physical Therapy: Initial Evaluation    Patient: Magdalena Mayfield (65 y.o. female)   Examination Date: 2025  Plan of Care Certification Period: 2025 to        :  1960 ;    Confirmed: Yes MRN: [id number]  CSN: [id number]   Insurance: Payor: [facility] / Plan: [facility] / Product Type: *No Product type* /   Insurance ID: [id number] - (Medicare Managed) Secondary Insurance (if applicable):    Referring Physician: Rodney Abdi PA     PCP: Randy Cadet MD Visits to Date/Visits Approved:   /      No Show/Cancelled Appts:   /       Medical Diagnosis: Left knee pain, unspecified chronicity [M25.562]  Patellofemoral syndrome of left knee [M22.2X2]  Iliotibial band syndrome of left side [M76.32]  Primary osteoarthritis of one knee, left [M17.12] L knee pain ,PFPS  Treatment Diagnosis: L knee pain , welling, weakness, limited gait and ROM     PERTINENT MEDICAL HISTORY   Patient Assessed for Rehabilitation Services: Yes  Self reported health status:: Good    Medical History: Chart Reviewed: Yes   Past Medical History:   Diagnosis Date    Anxiety     Asthma     Depression     Recurrent sinus infections      Surgical History:   Past Surgical History:   Procedure Laterality Date    BREAST BIOPSY Left     lcis    CYST REMOVAL      thigh 2015    TONSILLECTOMY      TUBAL LIGATION         Medications:   Current Outpatient Medications:     meloxicam (MOBIC) 15 MG tablet, Take 1 tablet by mouth daily as needed for Pain, Disp: 30 tablet, Rfl: 1    diclofenac sodium (VOLTAREN) 1 % GEL, Apply 4 g topically 4 times daily, Disp: 250 g, Rfl: 1    nicotine (NICOTINE STEP 3) 7 MG/24HR, Place 1 patch onto the skin every 24 hours, Disp: 30 patch, Rfl: 2    hydrOXYzine HCl (ATARAX) 25 MG tablet, Take 1 tablet by mouth 3 times daily as needed for Itching, Disp: , Rfl:     amLODIPine (NORVASC) 5 MG tablet, Take 1 tablet by mouth daily Needs office visit for further refills, Disp: 90 tablet, Rfl: 3    losartan (COZAAR)

## 2025-06-06 NOTE — PLAN OF CARE
[facility]  [facility]  [address]  [facility] [address]  Dept: [phone]  Dept Fax: [phone]  Loc: [phone]    PHYSICAL THERAPY PLAN OF CARE: INITIAL EVALUATION    Patient: Magdalena Mayfield (65 y.o. female)   Examination Date: 2025  Plan of Care Certification Period: 2025 to        :  1960  MRN: [id number]  CSN: [id number]   Insurance: Payor: [employer] / Plan: [employer] / Product Type: *No Product type* /   Insurance ID: [id number] - (Medicare Managed) Secondary Insurance (if applicable):    Referring Physician: Rodney Abdi PA     PCP: Randy Cadet MD Visits to Date/Visits Approved:   /      No Show/Cancelled Appts:   /       Medical Diagnosis: Left knee pain, unspecified chronicity [M25.562]  Patellofemoral syndrome of left knee [M22.2X2]  Iliotibial band syndrome of left side [M76.32]  Primary osteoarthritis of one knee, left [M17.12] L knee pain ,PFPS  Treatment Diagnosis: L knee pain , welling, weakness, limited gait and ROM         ASSESSMENT     Impression: Assessment: Pt appears with L knee swelling manly in the ant lat area and the popliteal space. She has no specific onset or mechanism of injury and reports that her R knee is also pain due to favoring her L knee. She appears with PFP type symptoms or possible bakers' cyst.  She appears with limited Rom and strength as well. Her x-rays some only mild OA. She does enjoy walking and gardening both of which are limited at this time.  Pt would benefit from skilled therapy interventions as needed to address listed impairments, progress toward goal completion and improve ADL/IADL status.  PT also warranted to reduce risk for further injury or decline.    Body Structures, Functions, Activity Limitations Requiring Skilled Therapeutic Intervention: Decreased functional mobility , Decreased ADL status, Decreased ROM, Decreased strength, Increased pain    Statement of

## 2025-06-09 ENCOUNTER — TELEPHONE (OUTPATIENT)
Dept: ORTHOPEDIC SURGERY | Age: 65
End: 2025-06-09

## 2025-06-09 NOTE — TELEPHONE ENCOUNTER
Spoke with patient and she would like to been seen for an appointment to discuss options. She is on the schedule for tomorrow.

## 2025-06-09 NOTE — TELEPHONE ENCOUNTER
Patient called in stating that she has continued to have pain and at times feels it is worse than her initial appointment. Pt describes the feeling as cramping on the posterior knee which makes it very uncomfortable to manage through the day. Pt stated that she has had a therapy visit and feels it has not helped with the pain but made it worse. She has tried the OTC medications, steroids and bracing, etc. with no notable improvements. She was wondering if an MRI would be an appropriate next step? Please advise.

## 2025-06-10 ENCOUNTER — OFFICE VISIT (OUTPATIENT)
Dept: ORTHOPEDIC SURGERY | Age: 65
End: 2025-06-10
Payer: COMMERCIAL

## 2025-06-10 VITALS — BODY MASS INDEX: 32.84 KG/M2 | HEART RATE: 124 BPM | HEIGHT: 68 IN | OXYGEN SATURATION: 98 %

## 2025-06-10 DIAGNOSIS — M79.89 PAIN AND SWELLING OF RIGHT LOWER EXTREMITY: ICD-10-CM

## 2025-06-10 DIAGNOSIS — M79.89 PAIN AND SWELLING OF LOWER EXTREMITY, LEFT: Primary | ICD-10-CM

## 2025-06-10 DIAGNOSIS — M79.605 PAIN AND SWELLING OF LOWER EXTREMITY, LEFT: Primary | ICD-10-CM

## 2025-06-10 DIAGNOSIS — M79.604 PAIN AND SWELLING OF RIGHT LOWER EXTREMITY: ICD-10-CM

## 2025-06-10 DIAGNOSIS — M71.22 SYNOVIAL CYST OF POPLITEAL SPACE (BAKER), LEFT KNEE: ICD-10-CM

## 2025-06-10 DIAGNOSIS — M25.462 EFFUSION OF LEFT KNEE: ICD-10-CM

## 2025-06-10 PROCEDURE — 20610 DRAIN/INJ JOINT/BURSA W/O US: CPT | Performed by: PHYSICIAN ASSISTANT

## 2025-06-10 PROCEDURE — 1123F ACP DISCUSS/DSCN MKR DOCD: CPT | Performed by: PHYSICIAN ASSISTANT

## 2025-06-10 PROCEDURE — 99213 OFFICE O/P EST LOW 20 MIN: CPT | Performed by: PHYSICIAN ASSISTANT

## 2025-06-10 RX ORDER — TRIAMCINOLONE ACETONIDE 40 MG/ML
80 INJECTION, SUSPENSION INTRA-ARTICULAR; INTRAMUSCULAR ONCE
Status: COMPLETED | OUTPATIENT
Start: 2025-06-10 | End: 2025-06-10

## 2025-06-10 RX ORDER — TRAMADOL HYDROCHLORIDE 50 MG/1
50 TABLET ORAL EVERY 6 HOURS PRN
Qty: 20 TABLET | Refills: 0 | Status: SHIPPED | OUTPATIENT
Start: 2025-06-10 | End: 2025-06-17

## 2025-06-10 RX ORDER — TRAMADOL HYDROCHLORIDE 50 MG/1
50 TABLET ORAL EVERY 6 HOURS PRN
Qty: 20 TABLET | Refills: 0 | Status: SHIPPED | OUTPATIENT
Start: 2025-06-10 | End: 2025-06-10

## 2025-06-10 RX ADMIN — TRIAMCINOLONE ACETONIDE 80 MG: 40 INJECTION, SUSPENSION INTRA-ARTICULAR; INTRAMUSCULAR at 11:06

## 2025-06-10 NOTE — PROGRESS NOTES
ORTHOPEDIC SURGERY OFFICE NOTE  CHIEF COMPLAINT:  Chief Complaint   Patient presents with    Knee Pain     Left knee pain      HISTORY OF PRESENT ILLNESS:  Magdalena Mayfield is a 65 y.o. female Patient seen in office today for left knee pain     DOI: over a month ago    Patient reports 10/10 pain.  RICE and medication are not effective to alleviate pain and reduce swelling.   Pain worsened by: Patient reports painful ROM & weight bearing.     Was given exercises from PT has been trying to do at home but having extreme pain.     HPI: 65-year-old female presenting back to office today for worsening left knee pain and a developing \"lump\" into the popliteal fossa of her knee.  States it has developed since the last time she was seen, which was 5/21.  At that time we trialed meloxicam and Voltaren gel for symptomatic relief.  She feels like her pain and swelling has worsened, has been in correspondence with our office over the last several weeks about this pain. Is now using a cane to walk.  Admits to this lump into the back of her knee which is causing the worsening pain and swelling of the left knee.  Has no definitive erythema, warmth throughout the knee.  No active signs of infection. Is able to flex and extend the knee but is has pain with walking.  Has attempted physical therapy sessions which were helpful.  Initially to see back at the beginning of July for possible MRI imaging and intra-articular injections.  Has no history of Baker's cyst.  No other history of recent trauma.    (Previous HPI:) 64 female presenting with complaints of left lateral knee pain, swelling. States is worsened over the last month. No new injury or tenderness. Worsened with crouching and going up and down stairs. Recently seen by primary care and is concern for possible LCL pathology. Has pain over the anterior aspect of the leg in the quadricep and down to the left lateral aspect of the leg. Also has some intermittent hip pain. Has no

## 2025-06-10 NOTE — PATIENT INSTRUCTIONS
Continue weight-bearing as tolerated.  Continue range of motion exercises as instructed.  Ice and elevate as needed.  Tylenol or Motrin for pain.  Injection given into the left knee.  Follow up on 07/02/2025.    We are committed to providing you the best care possible.  If you receive a survey after visiting one of our offices, please take time to share your experience concerning your physician office visit.  These surveys are confidential and no health information about you is shared.  We are eager to improve for you and we are counting on your feedback to help make that happen.

## 2025-06-10 NOTE — PROGRESS NOTES
Patient seen in office today for left knee pain     DOI: over a month ago    Patient reports 10/10 pain.  RICE and medication are not effective to alleviate pain and reduce swelling.   Pain worsened by: Patient reports painful ROM & weight bearing.     Was given exercises from PT has been trying to do at home but having extreme pain.

## 2025-06-17 ENCOUNTER — HOSPITAL ENCOUNTER (OUTPATIENT)
Dept: PHYSICAL THERAPY | Age: 65
Setting detail: THERAPIES SERIES
Discharge: HOME | End: 2025-06-17
Payer: COMMERCIAL

## 2025-06-17 PROCEDURE — 97110 THERAPEUTIC EXERCISES: CPT

## 2025-06-17 NOTE — FLOWSHEET NOTE
[place]    Exercises  - Supine Heel Slide with Strap  - 2 x daily - 7 x weekly - 1 sets - 10 reps  - Supine Quadricep Sets  - 2 x daily - 7 x weekly - 1 sets - 10 reps  - Supine Knee Extension Strengthening  - 2 x daily - 7 x weekly - 1 sets - 10 reps  - Seated Hamstring Stretch  - 2 x daily - 7 x weekly - 1 sets - 3 reps - 30 sec hold  - Gastroc Stretch on Wall  - 2 x daily - 7 x weekly - 1 sets - 3 reps - 30 sec hold    Manual Treatments:        Modalities:        Communication with other providers:  Cert sent to Rodney Abdi PA , 6/6/2025.       Assessment:  (Response towards treatment session) (Pain Rating) Pt demonstrated overall good tolerance to today's session. Pt does feel she has made some improvements since getting steroid injection a week ago. Now able to walk without cane and some without wearing knee brace. Demonstrated improvement with her knee flex ROM. Will continue with therapy progressing as tolerated.  End pain:2-3/10       Patient agrees with established plan of care and assisted in the development of their short term and long term goals. Patient had no adverse reaction with initial treatment and there are no barriers to learning. Demonstrates no mental or cognitive disorder.     Plan for Next Session:   Specific Instructions for Next Treatment: progress as tolerted , L knee ROM , strength, gait, manual    Time In / Time Out:    1300/1343           Timed Code/Total Treatment Minutes:  43/43, (3) TE      Next Progress Note due:  7/6/25      Plan of Care Interventions:  [x] Therapeutic Exercise  [x] Modalities: prn  [x] Therapeutic Activity     [] Ultrasound  [x] Estim  [x] Gait Training      [] Cervical Traction [] Lumbar Traction  [x] Neuromuscular Re-education    [x] Cold/hotpack [] Iontophoresis   [x] Instruction in HEP      [x] Vasopneumatic   [] Dry Needling    [x] Manual Therapy               [] Aquatic Therapy              Electronically signed by:  Nevin Madrid PTA 6/17/2025, 12:57

## 2025-07-01 ENCOUNTER — TELEPHONE (OUTPATIENT)
Dept: ORTHOPEDIC SURGERY | Age: 65
End: 2025-07-01

## 2025-07-01 NOTE — TELEPHONE ENCOUNTER
Patient called and would like to know if she can get a refill of Tramadol that you had filled for her once before, she is in a lot of pain and the OTC medications that she is taking are not helping. \      Please advise,     Thank you!

## 2025-07-02 ENCOUNTER — OFFICE VISIT (OUTPATIENT)
Dept: ORTHOPEDIC SURGERY | Age: 65
End: 2025-07-02

## 2025-07-02 VITALS — HEART RATE: 104 BPM | OXYGEN SATURATION: 98 % | HEIGHT: 68 IN | BODY MASS INDEX: 32.84 KG/M2

## 2025-07-02 DIAGNOSIS — M25.562 LEFT KNEE PAIN, UNSPECIFIED CHRONICITY: ICD-10-CM

## 2025-07-02 DIAGNOSIS — M22.2X2 PATELLOFEMORAL SYNDROME, BILATERAL: ICD-10-CM

## 2025-07-02 DIAGNOSIS — M25.561 RIGHT KNEE PAIN, UNSPECIFIED CHRONICITY: Primary | ICD-10-CM

## 2025-07-02 DIAGNOSIS — M22.2X1 PATELLOFEMORAL SYNDROME, BILATERAL: ICD-10-CM

## 2025-07-02 DIAGNOSIS — M17.0 OSTEOARTHRITIS OF BOTH KNEES, UNSPECIFIED OSTEOARTHRITIS TYPE: ICD-10-CM

## 2025-07-02 RX ORDER — TRIAMCINOLONE ACETONIDE 40 MG/ML
80 INJECTION, SUSPENSION INTRA-ARTICULAR; INTRAMUSCULAR ONCE
Status: COMPLETED | OUTPATIENT
Start: 2025-07-02 | End: 2025-07-02

## 2025-07-02 RX ADMIN — TRIAMCINOLONE ACETONIDE 80 MG: 40 INJECTION, SUSPENSION INTRA-ARTICULAR; INTRAMUSCULAR at 08:38

## 2025-07-02 NOTE — PROGRESS NOTES
ORTHOPEDIC SURGERY OFFICE NOTE  CHIEF COMPLAINT:  No chief complaint on file.    HISTORY OF PRESENT ILLNESS:  Magdalena Mayfield is a 65 y.o. female Patient seen in office today for right knee pain     DOI: fell on Sunday 06/29/2025    Patient reports 10/10 pain.  RICE and medication are effective to alleviate pain and reduce swelling. Pain worsened by: Patient reports painful ROM & weight bearing.   Patient wants physical therapy   Patient is interested in injection today  Xrays performed in office today.     Left knee brace that was given 05/21/2025 is ripped / torn. New brace is needed.   Patient is requesting another braces for right knee.        (HPI) 65-year-old female presenting to the office today for complaint of now right knee pain.  Recently seen and evaluated for left knee issues.  Was provided an intra-articular injection into the left knee on 6/10 after she was diagnosed with Baker's cyst and osteoarthritic changes.  Admits that her left knee is feeling improved today, states he has gotten therapeutic relief from the injection.  Admits that she has been going to physical therapy and she also feels like that is beneficial, has been taking Motrin and Tylenol also for relief.      Here today now stating that her right knee is also causing some issues, states that it locates into the anterior aspect of the leg down into the patella at times she feels like her kneecap \"is moving\".  States this causes pain throughout the knee, he states occasionally swells.  Has no significant erythema, no obvious joint effusion, no catching, popping, clicking.  No instability.    PAST MEDICAL HISTORY:  Past Medical History:   Diagnosis Date    Anxiety     Asthma     Depression     Recurrent sinus infections        PAST SURGICAL HISTORY:  Past Surgical History:   Procedure Laterality Date    BREAST BIOPSY Left     lcis    CYST REMOVAL      thigh 2/2015    TONSILLECTOMY      TUBAL LIGATION         SOCIAL HISTORY:  Social History

## 2025-07-02 NOTE — PATIENT INSTRUCTIONS
Bilateral knee braces were given today in office.  Continue weight-bearing as tolerated.  Continue range of motion exercises as instructed.  Ice and elevate as needed.  Tylenol or Motrin for pain.  Injection given into the right knee.  Follow up in 3 months.  Out patient Physical Therapy has been ordered by your provider. [facility] will call you to set up therapy. If you have not heard from them within 24-48 hours of today's appointment, please reach out to them at [phone].   We are committed to providing you the best care possible.  If you receive a survey after visiting one of our offices, please take time to share your experience concerning your physician office visit.  These surveys are confidential and no health information about you is shared.  We are eager to improve for you and we are counting on your feedback to help make that happen.

## 2025-07-02 NOTE — PROGRESS NOTES
Patient seen in office today for right knee pain     DOI: fell on Sunday 06/29/2025    Patient reports 10/10 pain.  RICE and medication are effective to alleviate pain and reduce swelling. Pain worsened by: Patient reports painful ROM & weight bearing.   Patient wants physical therapy   Patient is interested in injection today  Xrays performed in office today.     Left knee brace that was given 05/21/2025 is ripped / torn. New brace is needed.   Patient is requesting another braces for right knee.

## 2025-07-03 ENCOUNTER — HOSPITAL ENCOUNTER (OUTPATIENT)
Dept: PHYSICAL THERAPY | Age: 65
Setting detail: THERAPIES SERIES
Discharge: HOME | End: 2025-07-03
Payer: COMMERCIAL

## 2025-07-03 PROCEDURE — 97530 THERAPEUTIC ACTIVITIES: CPT

## 2025-07-03 PROCEDURE — 97110 THERAPEUTIC EXERCISES: CPT

## 2025-07-03 NOTE — PROGRESS NOTES
Outpatient Physical Therapy           [place]           [] Phone: [phone]   Fax: [phone]  [place]           [] Phone: [phone]   Fax: [phone]      To: Rodney Abdi PA     From: Abhishek Moeller, PT, PT     Patient: Magdalena Mayfield                    : 1960  Diagnosis:  Left knee pain, unspecified chronicity [M25.562]  Patellofemoral syndrome of left knee [M22.2X2]  Iliotibial band syndrome of left side [M76.32]  Primary osteoarthritis of one knee, left [M17.12]        Treatment Diagnosis:     B knee pain , PFP, stiffness, and limited gait  Date: 7/3/2025  [x]  Progress Note                []  Discharge Note    Evaluation Date:   25  Total Visits to date: 3    Cancels/No-shows to date:  0     Subjective:  Pt states she is only having 1/10 pain in B knee ans she notes the $490 copay is steep. She notes that she is wearing her braces for increased activity and doing well with her HEP. She notes that she does occasionally get catches in both knee and has benefit from the braes and the  therapy and injections. She reports her knees are % better overall.       Plan of Care/Treatment to date:  [x] Therapeutic Exercise    [x] Modalities: prn  [x] Therapeutic Activity     [] Ultrasound  [] Electrical Stimulation  [x] Gait Training      [] Cervical Traction   [] Lumbar Traction  [x] Neuromuscular Re-education  [] Cold/hotpack [] Iontophoresis  [x] Instruction in HEP      Other:  [x] Manual Therapy       [x]  Vasopneumatic  [] Aquatic Therapy       []   Dry Needle Therapy                      Objective/Significant Findings At Last Visit/Comments:  R knee pain added 25,   Knee PROM  flex R 140 L 142  , ext R 0   L 0 , SLR R slight lag L mo lag  ,gait: with cane and B knee braces community dist  , stairs: reciprocally with rail 1 flight , LEFS 65 pts R knee Knee Special Tests: MCL ( -  ), LCL (  - ), Ant. Drawer ( -  ), Post. Drawer (  - ), Lachman’s (  - ), Tushar’s(  -

## 2025-07-03 NOTE — FLOWSHEET NOTE
Outpatient Physical Therapy  [place]           [x] Phone: [phone]   Fax: [phone]  [place]           [] Phone: [phone]   Fax: [phone]        Physical Therapy Daily Treatment Note  Date:  7/3/2025    Patient Name:  Magdalena Mayfield                    :  1960                     MRN: [id number]  Restrictions/Precautions: No data recorded   Diagnosis:   Left knee pain, unspecified chronicity [M25.562]  Patellofemoral syndrome of left knee [M22.2X2]  Iliotibial band syndrome of left side [M76.32]  Primary osteoarthritis of one knee, left [M17.12] Diagnosis: L knee pain ,PFPS  R knee pain added 25 by Dr Abdi  Date of Injury/Surgery:   Treatment Diagnosis:  B knee pain , welling, weakness, limited gait and ROM  Insurance/Certification information: [employer] ( $40 copay)  Referring Physician:  Rodney Abdi PA     PCP: Randy Cadet MD  Next Doctor Visit:  25  Plan of care signed (Y/N):   no  Outcome Measure: LEFS 65pts  Visit# / total visits:  3 /  12-16  Pain level:      1/10  L knee , PFP   1/10  R knee pain PFP  Goals:     Patient goals: less l knee pain and better activity tolerance  Short term goals  Time Frame for Short term goals: 4 weeks  1. ind with HEP for knee  met  2. B knee full ext PROM  met  3. L knee girth equal to R at joint line  not tested  4. walking household dist with no cane needed  met  5. avg 4/10 or less B knee pain wiht typical ADLs   met  Long Term Goals  Time Frame for Long Term Goals: 6-8 weeks  1.  MMT B knee 4/5  for better ADL and functional ability   not met  2. B knee PROM flex 130 ext 0   met  3.walking with no AD community dist 2/10 or less   not met  4. stairs reciprocally 1 flight with rail  met  5. LEFS improved to 59 pt or less   met        Summary of Evaluation:  Assessment: Pt appears with L knee swelling manly in the ant lat area and the popliteal space. She has no specific onset or mechanism of injury and reports that

## 2025-07-11 ENCOUNTER — OFFICE VISIT (OUTPATIENT)
Dept: CARDIOLOGY CLINIC | Age: 65
End: 2025-07-11
Payer: COMMERCIAL

## 2025-07-11 VITALS
HEART RATE: 104 BPM | DIASTOLIC BLOOD PRESSURE: 70 MMHG | BODY MASS INDEX: 27.58 KG/M2 | SYSTOLIC BLOOD PRESSURE: 112 MMHG | HEIGHT: 68 IN | WEIGHT: 182 LBS

## 2025-07-11 DIAGNOSIS — R00.0 TACHYCARDIA: ICD-10-CM

## 2025-07-11 DIAGNOSIS — F41.9 ANXIETY: ICD-10-CM

## 2025-07-11 DIAGNOSIS — Z72.0 TOBACCO ABUSE: ICD-10-CM

## 2025-07-11 DIAGNOSIS — I10 PRIMARY HYPERTENSION: Primary | ICD-10-CM

## 2025-07-11 PROCEDURE — 1123F ACP DISCUSS/DSCN MKR DOCD: CPT | Performed by: NURSE PRACTITIONER

## 2025-07-11 PROCEDURE — 3074F SYST BP LT 130 MM HG: CPT | Performed by: NURSE PRACTITIONER

## 2025-07-11 PROCEDURE — 3078F DIAST BP <80 MM HG: CPT | Performed by: NURSE PRACTITIONER

## 2025-07-11 PROCEDURE — 93000 ELECTROCARDIOGRAM COMPLETE: CPT | Performed by: NURSE PRACTITIONER

## 2025-07-11 PROCEDURE — 99214 OFFICE O/P EST MOD 30 MIN: CPT | Performed by: NURSE PRACTITIONER

## 2025-07-11 RX ORDER — MONTELUKAST SODIUM 10 MG/1
10 TABLET ORAL NIGHTLY
COMMUNITY

## 2025-07-11 NOTE — PROGRESS NOTES
CLINICAL STAFF DOCUMENTATION    Chaya Barrett, SUSANNA     Magdalena Mayfield  1960  [id number]    Have you had any Chest Pain recently? - No      Have you had any Shortness of Breath - No      Have you had any dizziness - No      Have you had any palpitations recently? - No    Any thyroid issues? - No    Do you have any edema - swelling in pt has knee and leg problems, wears braces and in ot            When did you have your last labs drawn 7/11/25    Do we have the labs in their chart No Compunet      Do you need any prescriptions refilled? - No    Do you have a surgery or procedure scheduled in the near future - No      Do use tobacco products? - Yes  Do you drink alcohol? - No  Do you use any illicit drugs? - No  Caffeine? - sometimes        Check medication list thoroughly!!! AND RECONCILE OUTSIDE MEDICATIONS  If dose has changed change the entire order not just the MG  BE SURE TO ASK PATIENT IF THEY NEED MEDICATION REFILLS  Verify Pharmacy and update if incorrect    Add to every patient's \"wrap up\" the following dot phrase AFTERVISITCARDIOHEARTHOUSE and ensure we explain this to our patients

## 2025-07-11 NOTE — PROGRESS NOTES
a    CARDIOLOGY  NOTE    2025    Magdalena Mayfield (:  1960) is a 65 y.o. female,an established patient with Dr. Blackburn, here for evaluation of the following chief complaint(s):  No chief complaint on file.        SUBJECTIVE/OBJECTIVE:  History of Present Illness  The patient presents for evaluation of high heart rate.    She reports an elevated heart rate, which she believes is causing her heart to work harder. She expresses concern about the possibility of having a stroke. Additionally, she mentions difficulty in reaching with her left arm and describes a sensation as if her muscles are detaching from her bones.     Patient has a history of anxiety asthma and hypertension.  Previously found patient using Sudafed for allergies which was causing hypertension to be accelerated.  Blood pressure was acceptable with stopping Sudafed.    Patient is a smoker, 0.5 ppd. Patient denies issues obtaining or taking medications. Patient is active and does not do organized exercise. Patient denies chest pain, shortness of breath, palpitations, dizziness, orthopnea, lower leg swelling, or syncope.      Review of Systems   Constitutional:  Negative for fatigue and fever.   Respiratory:  Negative for cough and shortness of breath.    Cardiovascular:  Negative for chest pain, palpitations and leg swelling.   Musculoskeletal:  Negative for arthralgias and gait problem.   Neurological:  Negative for dizziness, syncope, weakness, light-headedness and headaches.       There were no vitals filed for this visit.      Wt Readings from Last 3 Encounters:   25 98 kg (216 lb)   24 98 kg (216 lb)   23 98.3 kg (216 lb 12.8 oz)       BP Readings from Last 3 Encounters:   23 124/82   23 124/80   06/15/23 129/84       Prior to Admission medications    Medication Sig Start Date End Date Taking? Authorizing Provider   meloxicam (MOBIC) 15 MG tablet Take 1 tablet by mouth daily as needed for Pain 25

## 2025-07-12 ENCOUNTER — HOSPITAL ENCOUNTER (EMERGENCY)
Age: 65
Discharge: HOME | End: 2025-07-12
Payer: COMMERCIAL

## 2025-07-12 VITALS
RESPIRATION RATE: 18 BRPM | HEART RATE: 82 BPM | BODY MASS INDEX: 27.58 KG/M2 | OXYGEN SATURATION: 98 % | DIASTOLIC BLOOD PRESSURE: 82 MMHG | SYSTOLIC BLOOD PRESSURE: 120 MMHG | HEIGHT: 68 IN | WEIGHT: 182 LBS | TEMPERATURE: 98.4 F

## 2025-07-12 DIAGNOSIS — M13.0 POLYARTHRITIS: Primary | ICD-10-CM

## 2025-07-12 PROCEDURE — 99283 EMERGENCY DEPT VISIT LOW MDM: CPT

## 2025-07-12 PROCEDURE — 93005 ELECTROCARDIOGRAM TRACING: CPT

## 2025-07-12 RX ORDER — INDOMETHACIN 50 MG/1
50 CAPSULE ORAL 3 TIMES DAILY
Qty: 60 CAPSULE | Refills: 0 | Status: SHIPPED | OUTPATIENT
Start: 2025-07-12

## 2025-07-12 ASSESSMENT — PAIN DESCRIPTION - FREQUENCY: FREQUENCY: INTERMITTENT

## 2025-07-12 ASSESSMENT — PAIN DESCRIPTION - PAIN TYPE: TYPE: ACUTE PAIN

## 2025-07-12 ASSESSMENT — PAIN - FUNCTIONAL ASSESSMENT
PAIN_FUNCTIONAL_ASSESSMENT: 0-10
PAIN_FUNCTIONAL_ASSESSMENT: PREVENTS OR INTERFERES SOME ACTIVE ACTIVITIES AND ADLS

## 2025-07-12 ASSESSMENT — LIFESTYLE VARIABLES
HOW MANY STANDARD DRINKS CONTAINING ALCOHOL DO YOU HAVE ON A TYPICAL DAY: PATIENT DOES NOT DRINK
HOW OFTEN DO YOU HAVE A DRINK CONTAINING ALCOHOL: NEVER

## 2025-07-12 ASSESSMENT — PAIN DESCRIPTION - ONSET: ONSET: ON-GOING

## 2025-07-12 ASSESSMENT — PAIN SCALES - GENERAL: PAINLEVEL_OUTOF10: 9

## 2025-07-12 ASSESSMENT — PAIN DESCRIPTION - LOCATION: LOCATION: LEG

## 2025-07-12 ASSESSMENT — PAIN DESCRIPTION - ORIENTATION: ORIENTATION: RIGHT;LEFT

## 2025-07-12 ASSESSMENT — PAIN DESCRIPTION - DESCRIPTORS: DESCRIPTORS: ACHING;CRAMPING

## 2025-07-12 NOTE — ED PROVIDER NOTES
Emergency Department Encounter  Location: [facility] EMERGENCY DEPARTMENT    Patient: Magdalena Mayfield  MRN: [id number]  : 1960  Date of evaluation: 2025  ED Provider: Rolando Tijerina DO    History from : Patient  Limitations to history : None    Chief Complaint:    Joint Pain (Pt endorses bilateral knee and ankle pain; pt endorses getting treated w/ cortisone injections in both knees and denies relief, pt states that she feels pain is getting worse. Pt states on Saturday she had BLLE in both feet.)    [place]:  Magdalena Mayfield is a 65 y.o. female that presents to the emergency department with joint pain.  Has had joint pain for several weeks.  Patient states her pain started with her left knee, was initially seen by Ortho and had a IA joint injection.  Had relief of her left knee it then started to develop pain in her right knee and also had an injection into that knee.  Following that she started to have pain in her wrists and hands, but states she has pain all over..  She states pain is particularly bad in the morning and she can hardly get out of bed.  Has followed up with her family doctor and has had inflammatory markers and rheumatoid markers drawn.has been taking meloxicam for pain relief without significant relief.  She states only thing that really has helped is tramadol.  No history of immunosuppression.  Nondiabetic.  No fevers or chills.      Past Medical History:   Diagnosis Date    Anxiety     Asthma     Depression     Recurrent sinus infections      Past Surgical History:   Procedure Laterality Date    BREAST BIOPSY Left     lcis    CYST REMOVAL      thigh 2015    TONSILLECTOMY      TUBAL LIGATION       Family History   Problem Relation Age of Onset    High Blood Pressure Mother     Breast Cancer Mother 85    Ovarian Cancer Neg Hx      Social History     Socioeconomic History    Marital status:      Spouse name: Not on file    Number of children: Not on file    Years

## 2025-07-13 LAB
EKG ATRIAL RATE: 111 BPM
EKG DIAGNOSIS: NORMAL
EKG P AXIS: 53 DEGREES
EKG P-R INTERVAL: 124 MS
EKG Q-T INTERVAL: 324 MS
EKG QRS DURATION: 90 MS
EKG QTC CALCULATION (BAZETT): 440 MS
EKG R AXIS: -49 DEGREES
EKG T AXIS: 66 DEGREES
EKG VENTRICULAR RATE: 111 BPM

## 2025-07-13 PROCEDURE — 93010 ELECTROCARDIOGRAM REPORT: CPT | Performed by: INTERNAL MEDICINE

## 2025-08-01 LAB
ALT SERPL-CCNC: 18 U/L (ref 0–60)
AST SERPL-CCNC: 11 U/L (ref 0–55)
BUN / CREAT RATIO: 25 (ref 7–25)
BUN BLDV-MCNC: 15 MG/DL (ref 3–29)
C REACTIVE PROTEIN (CRP), BODY FLUID: 24.14 MG/DL
C3 COMPLEMENT: 193 MG/DL (ref 90–180)
C4 COMPLEMENT: 31 MG/DL (ref 10–40)
CREAT SERPL-MCNC: 0.6 MG/DL (ref 0.5–1.2)
HCT VFR BLD CALC: 35.6 % (ref 34–49)
HEMOGLOBIN: 11.3 G/DL (ref 11.2–15.7)
HEPATITIS B VIRUS LITTLE E AB: NORMAL
HEPATITIS BE ANTIGEN: NORMAL
MCH RBC QN AUTO: 25.6 PG (ref 26–34)
MCHC RBC AUTO-ENTMCNC: 31.7 G/DL (ref 30.7–35.5)
MCV RBC AUTO: 80.5 FL (ref 80–100)
PDW BLD-RTO: 13.9 %
PLATELET # BLD: 546 K/UL (ref 140–400)
PMV BLD AUTO: 9.3 FL (ref 7.2–11.7)
RBC # BLD: 4.42 M/UL (ref 3.95–5.26)
RHEUMATOID FACTOR: 80 IU/ML
SED RATE, AUTOMATED: 67 MM/HR (ref 0–30)
TOTAL CK: 13 U/L (ref 0–200)
TSH ULTRASENSITIVE: 2.08 MCIU/ML (ref 0.4–4.5)
WBC # BLD: 14.9 K/UL (ref 3.5–10.9)

## 2025-08-02 LAB
HEP B S AGB SURF AG: 1.4 MIU/ML
HEP B S AGB SURF AG: NEGATIVE
HEPATITIS B CORE TOTAL ANTIBODY: NEGATIVE
HEPATITIS C VIRUS RNA SER/PLAS NCNC: NEGATIVE

## 2025-08-04 LAB
CYCLIC CITRULLINATED PEPTIDE ANTIBODY IGG: 916 EIA
MYELOPEROXIDASE AB: <=20 EIA
PR3 IGG: <=20 EIA

## 2025-08-05 LAB — PROTEIN PATTERN: NORMAL

## 2025-08-25 LAB
IGNF NEG CNTRL BLD: 0
M. TUBERCULOSIS STIM IFN-G CFP10 AG SPOT COUNT: 0
M. TUBERCULOSIS STIM IFN-G ESAT-6 AG SPOT COUNT: 0
MITOGEN IGNF.SPOT COUNT BLD: >20
MYCOBACTERIUM TUBERCULOSIS STIMULATED GAMMA INTERFERON AND SPOT COUNT PANEL: NEGATIVE